# Patient Record
Sex: MALE | Race: BLACK OR AFRICAN AMERICAN | Employment: OTHER | URBAN - METROPOLITAN AREA
[De-identification: names, ages, dates, MRNs, and addresses within clinical notes are randomized per-mention and may not be internally consistent; named-entity substitution may affect disease eponyms.]

---

## 2022-12-29 ENCOUNTER — HOSPITAL ENCOUNTER (EMERGENCY)
Age: 52
Discharge: HOME | End: 2022-12-29
Attending: EMERGENCY MEDICINE
Payer: COMMERCIAL

## 2022-12-29 ENCOUNTER — APPOINTMENT (OUTPATIENT)
Dept: GENERAL RADIOLOGY | Age: 52
End: 2022-12-29
Payer: COMMERCIAL

## 2022-12-29 VITALS
DIASTOLIC BLOOD PRESSURE: 94 MMHG | SYSTOLIC BLOOD PRESSURE: 166 MMHG | HEIGHT: 69 IN | OXYGEN SATURATION: 100 % | RESPIRATION RATE: 12 BRPM | BODY MASS INDEX: 44.27 KG/M2 | WEIGHT: 298.9 LBS | HEART RATE: 86 BPM | TEMPERATURE: 97.6 F

## 2022-12-29 DIAGNOSIS — M54.42 ACUTE BILATERAL LOW BACK PAIN WITH BILATERAL SCIATICA: Primary | ICD-10-CM

## 2022-12-29 DIAGNOSIS — M54.41 ACUTE BILATERAL LOW BACK PAIN WITH BILATERAL SCIATICA: Primary | ICD-10-CM

## 2022-12-29 PROCEDURE — 72100 X-RAY EXAM L-S SPINE 2/3 VWS: CPT

## 2022-12-29 PROCEDURE — 96372 THER/PROPH/DIAG INJ SC/IM: CPT

## 2022-12-29 PROCEDURE — 6370000000 HC RX 637 (ALT 250 FOR IP): Performed by: EMERGENCY MEDICINE

## 2022-12-29 PROCEDURE — 99284 EMERGENCY DEPT VISIT MOD MDM: CPT

## 2022-12-29 PROCEDURE — 6360000002 HC RX W HCPCS: Performed by: EMERGENCY MEDICINE

## 2022-12-29 RX ORDER — METHYLPREDNISOLONE 4 MG/1
TABLET ORAL
Qty: 1 KIT | Refills: 0 | Status: SHIPPED | OUTPATIENT
Start: 2022-12-29 | End: 2023-01-04

## 2022-12-29 RX ORDER — LISINOPRIL 20 MG/1
20 TABLET ORAL DAILY
COMMUNITY

## 2022-12-29 RX ORDER — EMPAGLIFLOZIN 10 MG/1
TABLET, FILM COATED ORAL
COMMUNITY
Start: 2022-10-14

## 2022-12-29 RX ORDER — HYDROCODONE BITARTRATE AND ACETAMINOPHEN 5; 325 MG/1; MG/1
1 TABLET ORAL ONCE
Status: COMPLETED | OUTPATIENT
Start: 2022-12-29 | End: 2022-12-29

## 2022-12-29 RX ORDER — ORPHENADRINE CITRATE 30 MG/ML
60 INJECTION INTRAMUSCULAR; INTRAVENOUS ONCE
Status: COMPLETED | OUTPATIENT
Start: 2022-12-29 | End: 2022-12-29

## 2022-12-29 RX ORDER — ATORVASTATIN CALCIUM 80 MG/1
TABLET, FILM COATED ORAL
COMMUNITY
Start: 2022-12-13

## 2022-12-29 RX ORDER — AMLODIPINE BESYLATE 5 MG/1
TABLET ORAL
COMMUNITY
Start: 2022-12-12

## 2022-12-29 RX ORDER — IBUPROFEN 800 MG/1
800 TABLET ORAL EVERY 8 HOURS PRN
Qty: 20 TABLET | Refills: 0 | Status: SHIPPED | OUTPATIENT
Start: 2022-12-29 | End: 2023-01-08

## 2022-12-29 RX ORDER — OMEPRAZOLE 40 MG/1
40 CAPSULE, DELAYED RELEASE ORAL DAILY
COMMUNITY
Start: 2022-04-21

## 2022-12-29 RX ORDER — LIDOCAINE 50 MG/G
1 PATCH TOPICAL DAILY
Qty: 10 PATCH | Refills: 0 | Status: SHIPPED | OUTPATIENT
Start: 2022-12-29 | End: 2023-01-08

## 2022-12-29 RX ORDER — PREDNISONE 20 MG/1
60 TABLET ORAL ONCE
Status: COMPLETED | OUTPATIENT
Start: 2022-12-29 | End: 2022-12-29

## 2022-12-29 RX ORDER — FERROUS SULFATE 325(65) MG
TABLET ORAL
COMMUNITY
Start: 2022-12-12

## 2022-12-29 RX ORDER — METHOCARBAMOL 750 MG/1
750 TABLET, FILM COATED ORAL 3 TIMES DAILY PRN
Qty: 20 TABLET | Refills: 0 | Status: SHIPPED | OUTPATIENT
Start: 2022-12-29 | End: 2023-01-05

## 2022-12-29 RX ADMIN — PREDNISONE 60 MG: 20 TABLET ORAL at 15:46

## 2022-12-29 RX ADMIN — ORPHENADRINE CITRATE 60 MG: 30 INJECTION INTRAMUSCULAR; INTRAVENOUS at 15:46

## 2022-12-29 RX ADMIN — HYDROCODONE BITARTRATE AND ACETAMINOPHEN 1 TABLET: 5; 325 TABLET ORAL at 15:46

## 2022-12-29 ASSESSMENT — PAIN DESCRIPTION - DESCRIPTORS
DESCRIPTORS: ACHING
DESCRIPTORS: ACHING

## 2022-12-29 ASSESSMENT — PAIN SCALES - GENERAL
PAINLEVEL_OUTOF10: 7
PAINLEVEL_OUTOF10: 8

## 2022-12-29 ASSESSMENT — PAIN DESCRIPTION - PAIN TYPE
TYPE: ACUTE PAIN;CHRONIC PAIN
TYPE: ACUTE PAIN

## 2022-12-29 ASSESSMENT — PAIN DESCRIPTION - LOCATION
LOCATION: BACK
LOCATION: BACK;NECK

## 2022-12-29 ASSESSMENT — PAIN - FUNCTIONAL ASSESSMENT
PAIN_FUNCTIONAL_ASSESSMENT: 0-10
PAIN_FUNCTIONAL_ASSESSMENT: 0-10

## 2022-12-29 ASSESSMENT — PAIN DESCRIPTION - FREQUENCY
FREQUENCY: CONTINUOUS
FREQUENCY: CONTINUOUS

## 2022-12-29 ASSESSMENT — PAIN DESCRIPTION - ORIENTATION: ORIENTATION: LOWER

## 2022-12-29 NOTE — ED NOTES
Patient given prescription, discharge instructions verbal and written, patient verbalized understanding. Alert/oriented X4, Clear speech.   Patient exhibits no distress, ambulates with steady gait per self leaving unit, no further request.      Bianca Brooks RN  12/29/22 7389

## 2022-12-29 NOTE — ED PROVIDER NOTES
[facility] EMERGENCY DEPT VISIT      Patient Identification  Maira Stallworth is a 50 y.o. male. Chief Complaint   Back Pain (lower)      History of Present Illness:    History was obtained from patient. This is a  50 y.o. male who presents ambulatory  to the ED with complaints of low back pain for the last 2 days. Patient states that occasionally radiates down into both of his thighs. It is worse with movement. He denies abdominal pain. No dysuria, frequency, urgency, hematuria, incontinence or retention. No fever. No definite injury. He took 4 Advil. No known injury but does do some heavy lifting. Past Medical History:   Diagnosis Date    Diabetes mellitus ([address]     Hyperlipidemia     Hypertension        History reviewed. No pertinent surgical history. No current facility-administered medications for this encounter.     Current Outpatient Medications:     omeprazole (PRILOSEC) 40 MG delayed release capsule, Take 40 mg by mouth daily, Disp: , Rfl:     methocarbamol (ROBAXIN-750) 750 MG tablet, Take 1 tablet by mouth 3 times daily as needed (muscle spasms), Disp: 20 tablet, Rfl: 0    ibuprofen (IBU) 800 MG tablet, Take 1 tablet by mouth every 8 hours as needed for Pain, Disp: 20 tablet, Rfl: 0    lidocaine (LIDODERM) 5 %, Place 1 patch onto the skin daily for 10 days 12 hours on, 12 hours off., Disp: 10 patch, Rfl: 0    methylPREDNISolone (MEDROL, MARYCRUZ,) 4 MG tablet, Take by mouth., Disp: 1 kit, Rfl: 0    amLODIPine (NORVASC) 5 MG tablet, TAKE 1 TABLET BY MOUTH DAILY, Disp: , Rfl:     atorvastatin (LIPITOR) 80 MG tablet, TAKE 1 TABLET BY MOUTH DAILY, Disp: , Rfl:     JARDIANCE 10 MG tablet, TAKE 1 TABLET BY MOUTH EVERY MORNING, Disp: , Rfl:     FEROSUL 325 (65 Fe) MG tablet, TAKE 1 TABLET BY MOUTH DAILY, Disp: , Rfl:     lisinopril (PRINIVIL;ZESTRIL) 20 MG tablet, Take 20 mg by mouth daily, Disp: , Rfl:     metFORMIN (GLUCOPHAGE) 500 MG tablet, Take 500 mg by mouth 2 times daily (with meals), Disp: , Rfl: No Known Allergies    Social History     Socioeconomic History    Marital status: Single     Spouse name: Not on file    Number of children: Not on file    Years of education: Not on file    Highest education level: Not on file   Occupational History    Not on file   Tobacco Use    Smoking status: Never    Smokeless tobacco: Never   Substance and Sexual Activity    Alcohol use: Yes     Comment: social    Drug use: Never    Sexual activity: Not on file   Other Topics Concern    Not on file   Social History Narrative    Not on file     Social Determinants of Health     Financial Resource Strain: Not on file   Food Insecurity: Not on file   Transportation Needs: Not on file   Physical Activity: Not on file   Stress: Not on file   Social Connections: Not on file   Intimate Partner Violence: Not on file   Housing Stability: Not on file       Nursing Notes Reviewed      ROS:  General: no fever  ENT: no sinus congestion, no sore throat  RESP: no cough, no shortness of breath  CARDIAC: no chest pain  GI: no abdominal pain, no vomiting, no diarrhea  : no dysuria, no hematuria, no urgency, no frequency, no retention, no incontinence, no flank pain  Musculoskeletal: no arthralgia, + myalgia, + back pain,  no joint swelling  NEURO: no headache, no numbness, no weakness, no dizziness  DERM: no rash, no erythema, no ecchymosis, no wounds        PHYSICAL EXAM:  GENERAL APPEARANCE: Cale Grant is in no acute respiratory distress. Awake and alert. VITAL SIGNS:   ED Triage Vitals [12/29/22 1320]   Enc Vitals Group      BP (!) 166/94      Heart Rate 86      Resp 12      Temp 97.6 °F (36.4 °C)      Temp Source Oral      SpO2 100 %      Weight 298 lb 14.4 oz (135.6 kg)      Height 5' 9\" (1.753 m)      Head Circumference       Peak Flow       Pain Score       Pain Loc       Pain Edu? Excl. in [address]? HEAD: Normocephalic, atraumatic. EYES:  Extraocular muscles are intact. Conjunctivas are pink. Negative scleral icterus. ENT:  Mucous membranes are moist.  Pharynx without erythema or exudates. NECK: Nontender and supple. CHEST: Clear to auscultation bilaterally. No rales, rhonchi, or wheezing. HEART:  Regular rate and rhythm. No murmurs. Strong and equal pulses in the upper and lower extremities. ABDOMEN: Soft,  nondistended, positive bowel sounds. abdomen is nontender. No guarding. No flank tenderness  MUSCULOSKELETAL:  Active range of motion of the upper and lower extremities. No edema. Bilateral lower back tenderness and midline lumbar spine tenderness. No thoracic spine tenderness  NEUROLOGICAL: Awake, alert and oriented x 3. Power intact in the  lower extremities including hip flexion, knee flexion and extension, foot dorsiflexion and plantarflexion. Sensation intact  DERMATOLOGIC: No petechiae, rashes, or ecchymoses. ED COURSE AND MEDICAL DECISION MAKING:      Radiology:  All plain films have been evaluated by myself. They may have been overread by radiologist as noted in chart. Other radiologic studies (i.e. CT, MRI, ultrasounds, etc ) have been interpreted by radiologist.     XR LUMBAR SPINE (2-3 VIEWS)   Final Result      No acute fracture seen. Labs:  No results found for this visit on 12/29/22. Treatment in the department:  Patient received the following while in the ED:  Medications   orphenadrine (NORFLEX) injection 60 mg (60 mg IntraMUSCular Given 12/29/22 1546)   HYDROcodone-acetaminophen (NORCO) 5-325 MG per tablet 1 tablet (1 tablet Oral Given 12/29/22 1546)   predniSONE (DELTASONE) tablet 60 mg (60 mg Oral Given 12/29/22 1546)       Repeat evaluation  shows patient to be feeling better. Medical decision making and differential diagnosis:  Patient with atraumatic low back pain with radicular symptoms to both legs but no symptoms of cauda equina. No prior back imaging. No red flags for epidural abscess. Lumbar spine plain xrays unremarkable. Neuro intact.    I estimate there is LOW risk for ABDOMINAL AORTIC ANEURYSM, DISSECTION, CAUDA EQUINA SYNDROME, EPIDURAL MASS LESION, SPINAL STENOSIS, OR HERNIATED DISK CAUSING SEVERE STENOSIS, KIDNEY STONE, PYELONEPHRITIS, VERTEBRAL FRACTURE, DISCITIS, OSTEOMYELITIS, thus I consider the discharge disposition reasonable. Mary Glover and I have discussed the diagnosis and risks, and we agree with discharging home to follow-up with their primary doctor. We also discussed returning to the Emergency Department immediately if new or worsening symptoms occur. Clinical Impression:  1. Acute bilateral low back pain with bilateral sciatica        Dispo:  Patient will be discharged  at this time. Patient was informed of this decision and agrees with plan. I have discussed lab and xray findings with patient and they understand. Questions were answered to the best of my ability. Followup:  [place]  [id number] [id number]  [address]    Schedule an appointment as soon as possible for a visit       Discharge vitals:  Blood pressure (!) 166/94, pulse 86, temperature 97.6 °F (36.4 °C), temperature source Oral, resp. rate 12, height 5' 9\" (1.753 m), weight 298 lb 14.4 oz (135.6 kg), SpO2 100 %. Prescriptions given:   Discharge Medication List as of 12/29/2022  5:20 PM        START taking these medications    Details   methocarbamol (ROBAXIN-750) 750 MG tablet Take 1 tablet by mouth 3 times daily as needed (muscle spasms), Disp-20 tablet, R-0Normal      ibuprofen (IBU) 800 MG tablet Take 1 tablet by mouth every 8 hours as needed for Pain, Disp-20 tablet, R-0Normal      lidocaine (LIDODERM) 5 % Place 1 patch onto the skin daily for 10 days 12 hours on, 12 hours off., Disp-10 patch, R-0Normal      methylPREDNISolone (MEDROL, MARYCRUZ,) 4 MG tablet Take by mouth., Disp-1 kit, R-0Normal               This chart was created using dragon voice recognition software.         Elisa Hennessy MD  12/30/22 0156

## 2022-12-29 NOTE — DISCHARGE INSTRUCTIONS
Moist heat to your back. Gentle range of motion exercises. Return for fever, increased back pain, numbness or weakness of the legs, difficulty urinating or other worsening symptoms. No heavy lifting.

## 2023-01-05 ENCOUNTER — HOSPITAL ENCOUNTER (OUTPATIENT)
Age: 53
Setting detail: OBSERVATION
Discharge: HOME | End: 2023-01-07
Attending: EMERGENCY MEDICINE | Admitting: INTERNAL MEDICINE
Payer: COMMERCIAL

## 2023-01-05 ENCOUNTER — APPOINTMENT (OUTPATIENT)
Dept: CT IMAGING | Age: 53
End: 2023-01-05
Payer: COMMERCIAL

## 2023-01-05 DIAGNOSIS — I10 ESSENTIAL HYPERTENSION: ICD-10-CM

## 2023-01-05 DIAGNOSIS — R07.2 PRECORDIAL PAIN: ICD-10-CM

## 2023-01-05 DIAGNOSIS — G45.9 TIA (TRANSIENT ISCHEMIC ATTACK): Primary | ICD-10-CM

## 2023-01-05 LAB
A/G RATIO: 1.8 (ref 1.1–2.2)
ALBUMIN SERPL-MCNC: 4.6 G/DL (ref 3.4–5)
ALP BLD-CCNC: 56 U/L (ref 40–129)
ALT SERPL-CCNC: 18 U/L (ref 10–40)
ANION GAP SERPL CALCULATED.3IONS-SCNC: 13 MMOL/L (ref 3–16)
AST SERPL-CCNC: 17 U/L (ref 15–37)
BASOPHILS ABSOLUTE: 0.1 K/UL (ref 0–0.2)
BASOPHILS RELATIVE PERCENT: 2 %
BILIRUB SERPL-MCNC: 0.3 MG/DL (ref 0–1)
BUN BLDV-MCNC: 18 MG/DL (ref 7–20)
CALCIUM SERPL-MCNC: 9.6 MG/DL (ref 8.3–10.6)
CHLORIDE BLD-SCNC: 100 MMOL/L (ref 99–110)
CO2: 24 MMOL/L (ref 21–32)
CREAT SERPL-MCNC: 0.8 MG/DL (ref 0.9–1.3)
D DIMER: <0.27 UG/ML FEU (ref 0–0.6)
EOSINOPHILS ABSOLUTE: 0.2 K/UL (ref 0–0.6)
EOSINOPHILS RELATIVE PERCENT: 3.7 %
GFR SERPL CREATININE-BSD FRML MDRD: >60 ML/MIN/{1.73_M2}
GLUCOSE BLD-MCNC: 123 MG/DL (ref 70–99)
GLUCOSE BLD-MCNC: 131 MG/DL (ref 70–99)
HCT VFR BLD CALC: 42 % (ref 40.5–52.5)
HEMOGLOBIN: 13.1 G/DL (ref 13.5–17.5)
LYMPHOCYTES ABSOLUTE: 2.3 K/UL (ref 1–5.1)
LYMPHOCYTES RELATIVE PERCENT: 36 %
MCH RBC QN AUTO: 23.2 PG (ref 26–34)
MCHC RBC AUTO-ENTMCNC: 31.3 G/DL (ref 31–36)
MCV RBC AUTO: 74.2 FL (ref 80–100)
MONOCYTES ABSOLUTE: 0.5 K/UL (ref 0–1.3)
MONOCYTES RELATIVE PERCENT: 8.5 %
NEUTROPHILS ABSOLUTE: 3.1 K/UL (ref 1.7–7.7)
NEUTROPHILS RELATIVE PERCENT: 49.8 %
PDW BLD-RTO: 15.8 % (ref 12.4–15.4)
PERFORMED ON: ABNORMAL
PLATELET # BLD: 244 K/UL (ref 135–450)
PMV BLD AUTO: 7.3 FL (ref 5–10.5)
POTASSIUM REFLEX MAGNESIUM: 4.3 MMOL/L (ref 3.5–5.1)
RBC # BLD: 5.66 M/UL (ref 4.2–5.9)
SODIUM BLD-SCNC: 137 MMOL/L (ref 136–145)
TOTAL PROTEIN: 7.1 G/DL (ref 6.4–8.2)
TROPONIN: <0.01 NG/ML
WBC # BLD: 6.3 K/UL (ref 4–11)

## 2023-01-05 PROCEDURE — 6370000000 HC RX 637 (ALT 250 FOR IP): Performed by: EMERGENCY MEDICINE

## 2023-01-05 PROCEDURE — 2580000003 HC RX 258: Performed by: STUDENT IN AN ORGANIZED HEALTH CARE EDUCATION/TRAINING PROGRAM

## 2023-01-05 PROCEDURE — 1200000000 HC SEMI PRIVATE

## 2023-01-05 PROCEDURE — 85379 FIBRIN DEGRADATION QUANT: CPT

## 2023-01-05 PROCEDURE — 84484 ASSAY OF TROPONIN QUANT: CPT

## 2023-01-05 PROCEDURE — 2500000003 HC RX 250 WO HCPCS: Performed by: EMERGENCY MEDICINE

## 2023-01-05 PROCEDURE — 70498 CT ANGIOGRAPHY NECK: CPT

## 2023-01-05 PROCEDURE — 96374 THER/PROPH/DIAG INJ IV PUSH: CPT

## 2023-01-05 PROCEDURE — 85025 COMPLETE CBC W/AUTO DIFF WBC: CPT

## 2023-01-05 PROCEDURE — 93005 ELECTROCARDIOGRAM TRACING: CPT | Performed by: EMERGENCY MEDICINE

## 2023-01-05 PROCEDURE — 6360000004 HC RX CONTRAST MEDICATION: Performed by: EMERGENCY MEDICINE

## 2023-01-05 PROCEDURE — 80053 COMPREHEN METABOLIC PANEL: CPT

## 2023-01-05 PROCEDURE — 6370000000 HC RX 637 (ALT 250 FOR IP): Performed by: STUDENT IN AN ORGANIZED HEALTH CARE EDUCATION/TRAINING PROGRAM

## 2023-01-05 PROCEDURE — G0378 HOSPITAL OBSERVATION PER HR: HCPCS

## 2023-01-05 PROCEDURE — 70450 CT HEAD/BRAIN W/O DYE: CPT

## 2023-01-05 PROCEDURE — 99285 EMERGENCY DEPT VISIT HI MDM: CPT

## 2023-01-05 RX ORDER — ACETAMINOPHEN 650 MG/1
650 SUPPOSITORY RECTAL EVERY 6 HOURS PRN
Status: DISCONTINUED | OUTPATIENT
Start: 2023-01-05 | End: 2023-01-07 | Stop reason: HOSPADM

## 2023-01-05 RX ORDER — ASPIRIN 81 MG/1
324 TABLET, CHEWABLE ORAL ONCE
Status: DISCONTINUED | OUTPATIENT
Start: 2023-01-05 | End: 2023-01-05 | Stop reason: SDUPTHER

## 2023-01-05 RX ORDER — ONDANSETRON 2 MG/ML
4 INJECTION INTRAMUSCULAR; INTRAVENOUS EVERY 6 HOURS PRN
Status: DISCONTINUED | OUTPATIENT
Start: 2023-01-05 | End: 2023-01-05

## 2023-01-05 RX ORDER — LABETALOL HYDROCHLORIDE 5 MG/ML
5 INJECTION, SOLUTION INTRAVENOUS ONCE
Status: COMPLETED | OUTPATIENT
Start: 2023-01-05 | End: 2023-01-05

## 2023-01-05 RX ORDER — POLYETHYLENE GLYCOL 3350 17 G/17G
17 POWDER, FOR SOLUTION ORAL DAILY PRN
Status: DISCONTINUED | OUTPATIENT
Start: 2023-01-05 | End: 2023-01-05

## 2023-01-05 RX ORDER — SODIUM CHLORIDE 0.9 % (FLUSH) 0.9 %
5-40 SYRINGE (ML) INJECTION EVERY 12 HOURS SCHEDULED
Status: DISCONTINUED | OUTPATIENT
Start: 2023-01-05 | End: 2023-01-07 | Stop reason: HOSPADM

## 2023-01-05 RX ORDER — POLYETHYLENE GLYCOL 3350 17 G/17G
17 POWDER, FOR SOLUTION ORAL DAILY PRN
Status: DISCONTINUED | OUTPATIENT
Start: 2023-01-05 | End: 2023-01-07 | Stop reason: HOSPADM

## 2023-01-05 RX ORDER — SODIUM CHLORIDE 9 MG/ML
INJECTION, SOLUTION INTRAVENOUS PRN
Status: DISCONTINUED | OUTPATIENT
Start: 2023-01-05 | End: 2023-01-07 | Stop reason: HOSPADM

## 2023-01-05 RX ORDER — ONDANSETRON 2 MG/ML
4 INJECTION INTRAMUSCULAR; INTRAVENOUS EVERY 6 HOURS PRN
Status: DISCONTINUED | OUTPATIENT
Start: 2023-01-05 | End: 2023-01-07 | Stop reason: HOSPADM

## 2023-01-05 RX ORDER — PANTOPRAZOLE SODIUM 40 MG/10ML
40 INJECTION, POWDER, LYOPHILIZED, FOR SOLUTION INTRAVENOUS DAILY
Status: DISCONTINUED | OUTPATIENT
Start: 2023-01-06 | End: 2023-01-07 | Stop reason: HOSPADM

## 2023-01-05 RX ORDER — ACETAMINOPHEN 325 MG/1
650 TABLET ORAL EVERY 6 HOURS PRN
Status: DISCONTINUED | OUTPATIENT
Start: 2023-01-05 | End: 2023-01-07 | Stop reason: HOSPADM

## 2023-01-05 RX ORDER — SODIUM CHLORIDE 0.9 % (FLUSH) 0.9 %
5-40 SYRINGE (ML) INJECTION PRN
Status: DISCONTINUED | OUTPATIENT
Start: 2023-01-05 | End: 2023-01-07 | Stop reason: HOSPADM

## 2023-01-05 RX ORDER — ASPIRIN 325 MG
325 TABLET ORAL ONCE
Status: COMPLETED | OUTPATIENT
Start: 2023-01-05 | End: 2023-01-05

## 2023-01-05 RX ADMIN — LABETALOL HYDROCHLORIDE 5 MG: 5 INJECTION, SOLUTION INTRAVENOUS at 16:11

## 2023-01-05 RX ADMIN — NITROGLYCERIN 1 INCH: 20 OINTMENT TOPICAL at 18:00

## 2023-01-05 RX ADMIN — NITROGLYCERIN 1 INCH: 20 OINTMENT TOPICAL at 23:14

## 2023-01-05 RX ADMIN — SODIUM CHLORIDE, PRESERVATIVE FREE 10 ML: 5 INJECTION INTRAVENOUS at 23:15

## 2023-01-05 RX ADMIN — IOPAMIDOL 75 ML: 755 INJECTION, SOLUTION INTRAVENOUS at 15:01

## 2023-01-05 RX ADMIN — NITROGLYCERIN 1 INCH: 20 OINTMENT TOPICAL at 14:53

## 2023-01-05 RX ADMIN — ACETAMINOPHEN 650 MG: 325 TABLET ORAL at 23:14

## 2023-01-05 RX ADMIN — ASPIRIN 325 MG ORAL TABLET 325 MG: 325 PILL ORAL at 14:54

## 2023-01-05 ASSESSMENT — ENCOUNTER SYMPTOMS
ABDOMINAL PAIN: 0
SORE THROAT: 0
CHEST TIGHTNESS: 0
RHINORRHEA: 0
VOICE CHANGE: 0
COUGH: 0
EYE REDNESS: 0
TROUBLE SWALLOWING: 0
ANAL BLEEDING: 0
STRIDOR: 0
ABDOMINAL DISTENTION: 0
DIARRHEA: 0
EYE PAIN: 0
BACK PAIN: 0
WHEEZING: 0
BLOOD IN STOOL: 0
EYE ITCHING: 0
CONSTIPATION: 0
SHORTNESS OF BREATH: 0
NAUSEA: 0
EYE DISCHARGE: 0
PHOTOPHOBIA: 0
FACIAL SWELLING: 0
SINUS PRESSURE: 0
VOMITING: 0

## 2023-01-05 ASSESSMENT — PAIN DESCRIPTION - LOCATION: LOCATION: CHEST

## 2023-01-05 ASSESSMENT — PAIN SCALES - GENERAL
PAINLEVEL_OUTOF10: 4
PAINLEVEL_OUTOF10: 0

## 2023-01-05 ASSESSMENT — PAIN DESCRIPTION - ORIENTATION: ORIENTATION: LEFT

## 2023-01-05 ASSESSMENT — PAIN DESCRIPTION - PAIN TYPE: TYPE: ACUTE PAIN

## 2023-01-05 NOTE — ED PROVIDER NOTES
Yoko Palencia is a 50year old male with a history of type 2 diabetes and HTN who experienced abrupt onset of light headedness/dizziness, left facial numbness and numbness/weakness of the LUE one hour prior to arrival. He also experienced chest pain under the left breast. NIH stroke score is 0 at the time of my evaluation at 2:10 pm. No history of CAD or stroke in the past.      BP (!) 168/88   Pulse 93   Temp 98 °F (36.7 °C) (Oral)   Resp 16   Ht 5' 9\" (1.753 m)   Wt (!) 300 lb 7 oz (136.3 kg)   SpO2 99%   BMI 44.37 kg/m²     I have reviewed the following from the nursing documentation:      Prior to Admission medications    Medication Sig Start Date End Date Taking?  Authorizing Provider   amLODIPine (NORVASC) 5 MG tablet TAKE 1 TABLET BY MOUTH DAILY 12/12/22   Historical Provider, MD   atorvastatin (LIPITOR) 80 MG tablet TAKE 1 TABLET BY MOUTH DAILY 12/13/22   Historical Provider, MD   JARDIANCE 10 MG tablet TAKE 1 TABLET BY MOUTH EVERY MORNING 10/14/22   Historical Provider, MD   FEROSESVIN 325 (65 Fe) MG tablet TAKE 1 TABLET BY MOUTH DAILY 12/12/22   Historical Provider, MD   lisinopril (PRINIVIL;ZESTRIL) 20 MG tablet Take 20 mg by mouth daily    Historical Provider, MD   metFORMIN (GLUCOPHAGE) 500 MG tablet Take 500 mg by mouth 2 times daily (with meals)    Historical Provider, MD   omeprazole (PRILOSEC) 40 MG delayed release capsule Take 40 mg by mouth daily 4/21/22   Historical Provider, MD   methocarbamol (ROBAXIN-750) 750 MG tablet Take 1 tablet by mouth 3 times daily as needed (muscle spasms) 12/29/22 1/5/23  Juan Field MD   ibuprofen (IBU) 800 MG tablet Take 1 tablet by mouth every 8 hours as needed for Pain 12/29/22 1/8/23  Juan Field MD   lidocaine (LIDODERM) 5 % Place 1 patch onto the skin daily for 10 days 12 hours on, 12 hours off. 12/29/22 1/8/23  Juan Field MD       Allergies as of 01/05/2023    (No Known Allergies)       Past Medical History:   Diagnosis Date Diabetes mellitus ([facility] [address]     Hyperlipidemia     Hypertension         Surgical History: History reviewed. No pertinent surgical history. Family History:  History reviewed. No pertinent family history. Social History     Socioeconomic History    Marital status: Single     Spouse name: Not on file    Number of children: Not on file    Years of education: Not on file    Highest education level: Not on file   Occupational History    Not on file   Tobacco Use    Smoking status: Never    Smokeless tobacco: Never   Substance and Sexual Activity    Alcohol use: Yes     Comment: social    Drug use: Never    Sexual activity: Not on file   Other Topics Concern    Not on file   Social History Narrative    Not on file     Social Determinants of Health     Financial Resource Strain: Not on file   Food Insecurity: Not on file   Transportation Needs: Not on file   Physical Activity: Not on file   Stress: Not on file   Social Connections: Not on file   Intimate Partner Violence: Not on file   Housing Stability: Not on file       Review of Systems   Constitutional:  Negative for activity change, appetite change, chills, diaphoresis, fatigue and fever. HENT: Negative. Negative for congestion, dental problem, ear pain, facial swelling, rhinorrhea, sinus pressure, sneezing, sore throat, tinnitus, trouble swallowing and voice change. Eyes:  Negative for photophobia, pain, discharge, redness, itching and visual disturbance. Respiratory:  Negative for cough, chest tightness, shortness of breath, wheezing and stridor. Cardiovascular:  Positive for chest pain. Negative for palpitations and leg swelling. Gastrointestinal:  Negative for abdominal distention, abdominal pain, anal bleeding, blood in stool, constipation, diarrhea, nausea and vomiting. Genitourinary:  Negative for difficulty urinating, dysuria, frequency, hematuria, penile discharge, testicular pain and urgency.    Musculoskeletal:  Negative for back pain, joint swelling, neck pain and neck stiffness. Skin:  Negative for rash and wound. Neurological:  Positive for numbness (left face and LUE). Negative for dizziness, syncope, facial asymmetry, speech difficulty, weakness and headaches. Hematological:  Does not bruise/bleed easily. Psychiatric/Behavioral:  Negative for agitation, confusion, hallucinations, self-injury, sleep disturbance and suicidal ideas. The patient is not nervous/anxious. All other systems reviewed and are negative. Physical Exam  Vitals and nursing note reviewed. Constitutional:       General: He is not in acute distress. Appearance: He is well-developed. HENT:      Head: Normocephalic and atraumatic. Right Ear: External ear normal.      Left Ear: External ear normal.      Nose: Nose normal.      Mouth/Throat:      Pharynx: No oropharyngeal exudate. Eyes:      General: No visual field deficit or scleral icterus. Right eye: No discharge. Left eye: No discharge. Conjunctiva/sclera: Conjunctivae normal.      Pupils: Pupils are equal, round, and reactive to light. Neck:      Vascular: No JVD. Trachea: No tracheal deviation. Cardiovascular:      Rate and Rhythm: Normal rate and regular rhythm. Heart sounds: Normal heart sounds. No murmur heard. No friction rub. No gallop. Pulmonary:      Effort: Pulmonary effort is normal. No respiratory distress. Breath sounds: Normal breath sounds. No wheezing or rales. Abdominal:      General: Bowel sounds are normal. There is no distension. Palpations: Abdomen is soft. There is no mass. Tenderness: There is no abdominal tenderness. There is no guarding or rebound. Musculoskeletal:         General: No tenderness. Normal range of motion. Cervical back: Normal range of motion and neck supple. Lymphadenopathy:      Cervical: No cervical adenopathy. Skin:     General: Skin is warm and dry. Coloration: Skin is not pale. Findings: No erythema or rash. Neurological:      Mental Status: He is alert and oriented to person, place, and time. GCS: GCS eye subscore is 4. GCS verbal subscore is 5. GCS motor subscore is 6. Cranial Nerves: No cranial nerve deficit, dysarthria or facial asymmetry. Sensory: Sensation is intact. Motor: Motor function is intact. No abnormal muscle tone. Coordination: Coordination is intact. Coordination normal.      Deep Tendon Reflexes: Reflexes are normal and symmetric. Reflexes normal. Babinski sign absent on the right side. Babinski sign absent on the left side. Reflex Scores:       Bicep reflexes are 2+ on the right side and 2+ on the left side. Patellar reflexes are 2+ on the right side and 2+ on the left side. Comments: Coordination, gait, speech, balance and cognition are intact. There is no nuchal rigidity or evidence of meningismus. Negative Kernig's and Brudzinski's signs. All sensory and motor components of the brachial/lumbosacral plexus tested are symmetric and intact. No focal deficits appreciated. NIH stroke score 0. Psychiatric:         Behavior: Behavior normal.         Thought Content:  Thought content normal.         Judgment: Judgment normal.        Procedures     MDM    CBC with Auto Differential   Result Value Ref Range    WBC 6.3 4.0 - 11.0 K/uL    RBC 5.66 4.20 - 5.90 M/uL    Hemoglobin 13.1 (L) 13.5 - 17.5 g/dL    Hematocrit 42.0 40.5 - 52.5 %    MCV 74.2 (L) 80.0 - 100.0 fL    MCH 23.2 (L) 26.0 - 34.0 pg    MCHC 31.3 31.0 - 36.0 g/dL    RDW 15.8 (H) 12.4 - 15.4 %    Platelets 388 800 - 545 K/uL    MPV 7.3 5.0 - 10.5 fL    Neutrophils % 49.8 %    Lymphocytes % 36.0 %    Monocytes % 8.5 %    Eosinophils % 3.7 %    Basophils % 2.0 %    Neutrophils Absolute 3.1 1.7 - 7.7 K/uL    Lymphocytes Absolute 2.3 1.0 - 5.1 K/uL    Monocytes Absolute 0.5 0.0 - 1.3 K/uL    Eosinophils Absolute 0.2 0.0 - 0.6 K/uL    Basophils Absolute 0.1 0.0 - 0.2 K/uL   Comprehensive Metabolic Panel w/ Reflex to MG   Result Value Ref Range    Sodium 137 136 - 145 mmol/L    Potassium reflex Magnesium 4.3 3.5 - 5.1 mmol/L    Chloride 100 99 - 110 mmol/L    CO2 24 21 - 32 mmol/L    Anion Gap 13 3 - 16    Glucose 123 (H) 70 - 99 mg/dL    BUN 18 7 - 20 mg/dL    Creatinine 0.8 (L) 0.9 - 1.3 mg/dL    Est, Glom Filt Rate >60 >60    Calcium 9.6 8.3 - 10.6 mg/dL    Total Protein 7.1 6.4 - 8.2 g/dL    Albumin 4.6 3.4 - 5.0 g/dL    Albumin/Globulin Ratio 1.8 1.1 - 2.2    Total Bilirubin 0.3 0.0 - 1.0 mg/dL    Alkaline Phosphatase 56 40 - 129 U/L    ALT 18 10 - 40 U/L    AST 17 15 - 37 U/L   Troponin   Result Value Ref Range    Troponin <0.01 <0.01 ng/mL   D-Dimer, Quantitative   Result Value Ref Range    D-Dimer, Quant <0.27 0.00 - 0.60 ug/mL FEU   POCT Glucose   Result Value Ref Range    POC Glucose 131 (H) 70 - 99 mg/dl    Performed on ACCU-CHEK    EKG 12 Lead   Result Value Ref Range    Ventricular Rate 92 BPM    Atrial Rate 92 BPM    P-R Interval 132 ms    QRS Duration 70 ms    Q-T Interval 362 ms    QTc Calculation (Bazett) 447 ms    P Axis 45 degrees    R Axis 12 degrees    T Axis 63 degrees    Diagnosis       Normal sinus rhythmNonspecific T wave abnormalityAbnormal ECG       I spoke with Dr. Nicole, hospitalist at [facility]. We thoroughly discussed the history, physical exam, laboratory and imaging studies, as well as, emergency department course. Based upon that discussion, we've decided to admit Ross Downey for further observation and evaluation of Ross Downey's CVA-like symptoms.  As I have deemed necessary from their history, physical and studies, I have considered and evaluated Ross Downey for the following diagnoses:  DIABETES, INTRACRANIAL HEMORRHAGE, MENINGITIS, SUBARACHNOID HEMORRHAGE, SUBDURAL HEMATOMA, & STROKE.    FINAL IMPRESSION  1. TIA (transient ischemic attack)    2. Precordial pain    3. Essential hypertension        Vitals:  Blood pressure (!) 160/85,  pulse 95, temperature 98 °F (36.7 °C), temperature source Oral, resp. rate 20, height 5' 9\" (1.753 m), weight (!) 300 lb 7 oz (136.3 kg), SpO2 96 %. Radiology    CT HEAD WO CONTRAST    Result Date: 1/5/2023  1. Normal brain 2. Left maxillary sinus retention cyst    CTA HEAD NECK W CONTRAST    Result Date: 1/5/2023  1. Normal right internal carotid artery. . 2. Normal left internal carotid artery 3. Normal vertebral arteries. 4. Normal intracranial circulation. EKG Interpretation. The Ekg interpreted by me in the absence of a cardiologist shows. normal sinus rhythm with a rate of 92  Axis is   Normal  QTc is  within an acceptable range  Intervals and Durations are unremarkable. No specific ST-T wave changes appreciated. No evidence of acute ischemia. No old EKGs available for comparison.            Jessica Richardson MD  01/05/23 1800

## 2023-01-06 PROBLEM — R07.89 LEFT CHEST PRESSURE: Status: ACTIVE | Noted: 2023-01-05

## 2023-01-06 PROBLEM — G45.9 TIA (TRANSIENT ISCHEMIC ATTACK): Status: ACTIVE | Noted: 2023-01-06

## 2023-01-06 LAB
ANION GAP SERPL CALCULATED.3IONS-SCNC: 14 MMOL/L (ref 3–16)
BUN BLDV-MCNC: 15 MG/DL (ref 7–20)
CALCIUM SERPL-MCNC: 9.6 MG/DL (ref 8.3–10.6)
CHLORIDE BLD-SCNC: 101 MMOL/L (ref 99–110)
CHOLESTEROL, TOTAL: 149 MG/DL (ref 0–199)
CO2: 23 MMOL/L (ref 21–32)
CREAT SERPL-MCNC: 0.9 MG/DL (ref 0.9–1.3)
ESTIMATED AVERAGE GLUCOSE: 151.3 MG/DL
GFR SERPL CREATININE-BSD FRML MDRD: >60 ML/MIN/{1.73_M2}
GLUCOSE BLD-MCNC: 106 MG/DL (ref 70–99)
GLUCOSE BLD-MCNC: 108 MG/DL (ref 70–99)
GLUCOSE BLD-MCNC: 112 MG/DL (ref 70–99)
GLUCOSE BLD-MCNC: 115 MG/DL (ref 70–99)
GLUCOSE BLD-MCNC: 157 MG/DL (ref 70–99)
GLUCOSE BLD-MCNC: 167 MG/DL (ref 70–99)
HBA1C MFR BLD: 6.9 %
HCT VFR BLD CALC: 41.4 % (ref 40.5–52.5)
HDLC SERPL-MCNC: 65 MG/DL (ref 40–60)
HEMOGLOBIN: 13.1 G/DL (ref 13.5–17.5)
LDL CHOLESTEROL CALCULATED: 73 MG/DL
LV EF: 58 %
LV EF: 75 %
LVEF MODALITY: NORMAL
LVEF MODALITY: NORMAL
MCH RBC QN AUTO: 23.6 PG (ref 26–34)
MCHC RBC AUTO-ENTMCNC: 31.7 G/DL (ref 31–36)
MCV RBC AUTO: 74.5 FL (ref 80–100)
PDW BLD-RTO: 15.8 % (ref 12.4–15.4)
PERFORMED ON: ABNORMAL
PLATELET # BLD: 232 K/UL (ref 135–450)
PMV BLD AUTO: 6.9 FL (ref 5–10.5)
POTASSIUM REFLEX MAGNESIUM: 4.7 MMOL/L (ref 3.5–5.1)
RBC # BLD: 5.56 M/UL (ref 4.2–5.9)
SODIUM BLD-SCNC: 138 MMOL/L (ref 136–145)
TRIGL SERPL-MCNC: 56 MG/DL (ref 0–150)
VLDLC SERPL CALC-MCNC: 11 MG/DL
WBC # BLD: 6.9 K/UL (ref 4–11)

## 2023-01-06 PROCEDURE — 85027 COMPLETE CBC AUTOMATED: CPT

## 2023-01-06 PROCEDURE — 36415 COLL VENOUS BLD VENIPUNCTURE: CPT

## 2023-01-06 PROCEDURE — G0378 HOSPITAL OBSERVATION PER HR: HCPCS

## 2023-01-06 PROCEDURE — 80061 LIPID PANEL: CPT

## 2023-01-06 PROCEDURE — 6370000000 HC RX 637 (ALT 250 FOR IP)

## 2023-01-06 PROCEDURE — 83036 HEMOGLOBIN GLYCOSYLATED A1C: CPT

## 2023-01-06 PROCEDURE — 6360000002 HC RX W HCPCS: Performed by: STUDENT IN AN ORGANIZED HEALTH CARE EDUCATION/TRAINING PROGRAM

## 2023-01-06 PROCEDURE — C8929 TTE W OR WO FOL WCON,DOPPLER: HCPCS

## 2023-01-06 PROCEDURE — 6360000002 HC RX W HCPCS: Performed by: INTERNAL MEDICINE

## 2023-01-06 PROCEDURE — 96372 THER/PROPH/DIAG INJ SC/IM: CPT

## 2023-01-06 PROCEDURE — A9502 TC99M TETROFOSMIN: HCPCS | Performed by: INTERNAL MEDICINE

## 2023-01-06 PROCEDURE — 93017 CV STRESS TEST TRACING ONLY: CPT

## 2023-01-06 PROCEDURE — 97165 OT EVAL LOW COMPLEX 30 MIN: CPT

## 2023-01-06 PROCEDURE — 99233 SBSQ HOSP IP/OBS HIGH 50: CPT | Performed by: INTERNAL MEDICINE

## 2023-01-06 PROCEDURE — 80048 BASIC METABOLIC PNL TOTAL CA: CPT

## 2023-01-06 PROCEDURE — 97161 PT EVAL LOW COMPLEX 20 MIN: CPT

## 2023-01-06 PROCEDURE — 6370000000 HC RX 637 (ALT 250 FOR IP): Performed by: EMERGENCY MEDICINE

## 2023-01-06 PROCEDURE — 96375 TX/PRO/DX INJ NEW DRUG ADDON: CPT

## 2023-01-06 PROCEDURE — 97116 GAIT TRAINING THERAPY: CPT

## 2023-01-06 PROCEDURE — 92610 EVALUATE SWALLOWING FUNCTION: CPT

## 2023-01-06 PROCEDURE — C9113 INJ PANTOPRAZOLE SODIUM, VIA: HCPCS | Performed by: STUDENT IN AN ORGANIZED HEALTH CARE EDUCATION/TRAINING PROGRAM

## 2023-01-06 PROCEDURE — 99222 1ST HOSP IP/OBS MODERATE 55: CPT | Performed by: NURSE PRACTITIONER

## 2023-01-06 PROCEDURE — 2580000003 HC RX 258: Performed by: STUDENT IN AN ORGANIZED HEALTH CARE EDUCATION/TRAINING PROGRAM

## 2023-01-06 PROCEDURE — 78452 HT MUSCLE IMAGE SPECT MULT: CPT

## 2023-01-06 PROCEDURE — 3430000000 HC RX DIAGNOSTIC RADIOPHARMACEUTICAL: Performed by: INTERNAL MEDICINE

## 2023-01-06 RX ORDER — ATORVASTATIN CALCIUM 80 MG/1
80 TABLET, FILM COATED ORAL DAILY
Status: DISCONTINUED | OUTPATIENT
Start: 2023-01-06 | End: 2023-01-07 | Stop reason: HOSPADM

## 2023-01-06 RX ORDER — AMLODIPINE BESYLATE 5 MG/1
5 TABLET ORAL DAILY
Status: DISCONTINUED | OUTPATIENT
Start: 2023-01-06 | End: 2023-01-07 | Stop reason: HOSPADM

## 2023-01-06 RX ORDER — ENOXAPARIN SODIUM 100 MG/ML
30 INJECTION SUBCUTANEOUS 2 TIMES DAILY
Status: DISCONTINUED | OUTPATIENT
Start: 2023-01-06 | End: 2023-01-07 | Stop reason: HOSPADM

## 2023-01-06 RX ORDER — SODIUM CHLORIDE 9 MG/ML
INJECTION, SOLUTION INTRAVENOUS CONTINUOUS
Status: DISCONTINUED | OUTPATIENT
Start: 2023-01-06 | End: 2023-01-06

## 2023-01-06 RX ADMIN — SODIUM CHLORIDE, PRESERVATIVE FREE 10 ML: 5 INJECTION INTRAVENOUS at 20:01

## 2023-01-06 RX ADMIN — AMLODIPINE BESYLATE 5 MG: 5 TABLET ORAL at 18:16

## 2023-01-06 RX ADMIN — PANTOPRAZOLE SODIUM 40 MG: 40 INJECTION, POWDER, LYOPHILIZED, FOR SOLUTION INTRAVENOUS at 08:34

## 2023-01-06 RX ADMIN — ENOXAPARIN SODIUM 30 MG: 100 INJECTION SUBCUTANEOUS at 20:01

## 2023-01-06 RX ADMIN — SODIUM CHLORIDE, PRESERVATIVE FREE 10 ML: 5 INJECTION INTRAVENOUS at 08:34

## 2023-01-06 RX ADMIN — TETROFOSMIN 10 MILLICURIE: 1.38 INJECTION, POWDER, LYOPHILIZED, FOR SOLUTION INTRAVENOUS at 10:57

## 2023-01-06 RX ADMIN — NITROGLYCERIN 1 INCH: 20 OINTMENT TOPICAL at 18:16

## 2023-01-06 RX ADMIN — TETROFOSMIN 30 MILLICURIE: 1.38 INJECTION, POWDER, LYOPHILIZED, FOR SOLUTION INTRAVENOUS at 12:00

## 2023-01-06 RX ADMIN — SODIUM CHLORIDE: 9 INJECTION, SOLUTION INTRAVENOUS at 01:57

## 2023-01-06 RX ADMIN — ENOXAPARIN SODIUM 30 MG: 100 INJECTION SUBCUTANEOUS at 13:08

## 2023-01-06 RX ADMIN — REGADENOSON 0.4 MG: 0.08 INJECTION, SOLUTION INTRAVENOUS at 11:26

## 2023-01-06 RX ADMIN — NITROGLYCERIN 1 INCH: 20 OINTMENT TOPICAL at 06:00

## 2023-01-06 RX ADMIN — ATORVASTATIN CALCIUM 80 MG: 80 TABLET, FILM COATED ORAL at 18:16

## 2023-01-06 ASSESSMENT — ENCOUNTER SYMPTOMS
NAUSEA: 0
CONSTIPATION: 0
COUGH: 0
DIARRHEA: 0
BACK PAIN: 0
VOMITING: 0
SHORTNESS OF BREATH: 0

## 2023-01-06 ASSESSMENT — PAIN SCALES - GENERAL: PAINLEVEL_OUTOF10: 0

## 2023-01-06 NOTE — PROGRESS NOTES
Occupational Therapy/Physical Therapy    OT/PT orders received, chart reviewed, pt is currently off floor, getting echo. Will follow up later today vs 1/7 as schedule allows.   Paty Spears OTR/ROSA [address]  Jewel Mirza, PT

## 2023-01-06 NOTE — PLAN OF CARE
Problem: Discharge Planning  Goal: Discharge to home or other facility with appropriate resources  Flowsheets (Taken 1/5/2023 4003)  Discharge to home or other facility with appropriate resources:   Identify barriers to discharge with patient and caregiver   Arrange for needed discharge resources and transportation as appropriate   Identify discharge learning needs (meds, wound care, etc)   Arrange for interpreters to assist at discharge as needed   Refer to discharge planning if patient needs post-hospital services based on physician order or complex needs related to functional status, cognitive ability or social support system

## 2023-01-06 NOTE — PROGRESS NOTES
Physical Therapy  Facility/Department: [place] [address] 5T ORTHO/NEURO  Physical Therapy Initial Assessment/Discharge from Acute PT    Name: Gemini Reyez  : 1970  MRN: [id number]  Date of Service: 2023    Discharge Recommendations:  Gemini Reyez scored a 24/24 on the AM-PAC short mobility form. At this time, no further PT is recommended upon discharge due to pt performing all functional mobility at [place] level. Pt reporting that he is functioning at his baseline and has no concerns for d/c. Bassett Anali Recommend patient returns to prior setting with prior services. PT Equipment Recommendations  Equipment Needed: No      Patient Diagnosis(es): The primary encounter diagnosis was TIA (transient ischemic attack). Diagnoses of Precordial pain and Essential hypertension were also pertinent to this visit. Past Medical History:  has a past medical history of Diabetes mellitus ([address] Hyperlipidemia, and Hypertension. Past Surgical History:  has no past surgical history on file. Assessment   Assessment: Pt is a 50 y.o. male with diagnosis of precordial pain. No acute goals identifed due to pt performing all functional mobility at [place] level. Pt reporting that he is functioning at his baseline and has no concerns for d/c. PT will sign off at this time. PT recommends initial assist PRN for safety. Treatment Diagnosis: precordial pain  Therapy Prognosis: Good  Decision Making: Low Complexity  Requires PT Follow-Up: No  Activity Tolerance  Activity Tolerance: Patient tolerated treatment well     Plan   Physcial Therapy Plan  Additional Comments: No acute goals identifed due to pt performing all functional mobility at [place] level. Pt reporting that he is functioning at his baseline and has no concerns for d/c. Safety Devices  Type of Devices:  All fall risk precautions in place, Call light within reach, Gait belt, Left in chair, Nurse notified (per RN, ok to leave pt off chair alarm) Restrictions  Position Activity Restriction  Other position/activity restrictions: up as tolerated     Subjective   General  Chart Reviewed: Yes  Patient assessed for rehabilitation services?: Yes  Additional Pertinent Hx: Pt is a 50 y.o. male admitted to [facility] from [facility] ED with complaints of sudden onset of lightheadedness/dizziness, L facial numbness, LUE numbness and weakness, and L chest pain. CT head: L maxillary sinus retention cyst.  PMH: DM, HLD, HTN. Family / Caregiver Present: Yes (SO)  Referring Practitioner: Danya Chino MD  Diagnosis: precordial pain  Follows Commands: Within Functional Limits  General Comment  Comments: Pt found seated in recliner upon PT arrival.  Pt agreeable to therapy session. Subjective  Subjective: \"I feel pretty good right now actually. \"         Social/Functional History  Social/Functional History  Lives With: Spouse  Type of Home: House  Home Layout: One level, Able to Live on Main level with bedroom/bathroom  Home Access: Stairs to enter with rails  Entrance Stairs - Number of Steps: 6-7 AARON with LHR  Bathroom Shower/Tub: Tub/Shower unit (pt typically stands)  Bathroom Toilet: Standard (sink nearby)  [employer]: Grab bars in shower  Bathroom Accessibility: Not accessible  Home Equipment: None  Has the patient had two or more falls in the past year or any fall with injury in the past year?: No  ADL Assistance: Independent  Homemaking Assistance: Independent  Ambulation Assistance: Independent (without AD)  Transfer Assistance: Independent  Active : Yes  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   Vision/Hearing  Vision  Vision: Impaired  Vision Exceptions: Wears glasses for reading  Hearing  Hearing: Within functional limits    Cognition   Orientation  Overall Orientation Status: Within Functional Limits  Cognition  Overall Cognitive Status: WFL     Objective   Heart Rate: 71  Heart Rate Source: Monitor  BP: (!) 171/102  BP Location: Right upper arm  BP Method: Automatic  Patient Position: Up in chair  MAP (Calculated): 125  Resp: 16  SpO2: 98 %  O2 Device: None (Room air)              AROM RLE (degrees)  RLE AROM: WFL  AROM LLE (degrees)  LLE AROM : WFL  Strength RLE  Strength RLE: WFL  Comment: based on functional mobility  Strength LLE  Strength LLE: WFL  Comment: based on functional mobility           Bed mobility  Bed Mobility Comments: pt found and left seated in recliner  Transfers  Sit to Stand: Independent (from recliner, toilet, and chair to no AD)  Stand to Sit: Independent  Ambulation  Surface: Level tile  Device: No Device  Assistance: Independent  Quality of Gait: normal [place]  Distance: 150' + 150' + small distance in room  Comments: Pt reporting that he is ambulating at his baseline and has no concerns for d/c. Stairs/Curb  Stairs?: Yes  Stairs  # Steps : 10  Stairs Height: 6\" (Pt ascend/descend 6, 4\" stairs and 4, 6\" stairs)  Rails: Left ascending  Assistance: Modified independent   Comment: Alternating pattern. Pt reporting that he is performing stairs at his baseline and has no concerns for d/c. Balance  Comments: Ind for standing balance at sink with no UE support for gown change and oral care, see OT note for comments. OutComes Score                                                  AM-PAC Score  AM-PAC Inpatient Mobility Raw Score : 24 (01/06/23 1547)  AM-PAC Inpatient T-Scale Score : 61.14 (01/06/23 1547)  Mobility Inpatient CMS 0-100% Score: 0 (01/06/23 1547)  Mobility Inpatient CMS G-Code Modifier : CH (01/06/23 1547)          Tinneti Score       Goals  Short Term Goals  Time Frame for Short Term Goals: No acute goals identifed due to pt performing all functional mobility at [place] level.   Pt reporting that he is functioning at his baseline and has no concerns for d/c. Patient Goals   Patient Goals : \"To get out of here and get home. \"       Education  Patient Education  Education Given To: Patient; Family  Education Provided: Role of Therapy;Plan of Care  Education Method: Verbal  Barriers to Learning: None  Education Outcome: Verbalized understanding      Therapy Time   Individual Concurrent Group Co-treatment   Time In 1508         Time Out 1532         Minutes 24             Timed Code Treatment Minutes:   9    Total Treatment Minutes:  LONDON Mcclellan   This note to serve as discharge summary if patient discharged before next session.

## 2023-01-06 NOTE — PROGRESS NOTES
Speech Language Pathology  Facility/Department: [facility] 5T ORTHO/NEURO   CLINICAL BEDSIDE SWALLOW EVALUATION/Discharge    NAME: Kelsi Villagomez  : 1970  MRN: [id number]    ADMISSION DATE: 2023  ADMITTING DIAGNOSIS: has Left chest pressure on their problem list.  ONSET DATE: 23    Recent Chest Xray/CT of Chest: none    CT Head: 23  Impression   1. Normal brain   2. Left maxillary sinus retention cyst           Date of Eval: 2023  Evaluating Therapist: ANDRZEJ Brennan    Current Diet level:  Current Diet : NPO    Primary Complaint  Patient Complaint: none    Pain:  Pain Assessment  Pain Assessment: None - Denies Pain  Pain Level: 0  Patient's Stated Pain Goal: 0 - No pain  Pain Location: Chest  Pain Orientation: Left  Pain Type: Acute pain  Response to Pain Intervention: Pain improved but above pain goal  Side Effects: No reported side effects    Reason for Referral  Kelsi Villagomez was referred for a bedside swallow evaluation to assess the efficiency of his swallow function, identify signs and symptoms of aspiration and make recommendations regarding safe dietary consistencies, effective compensatory strategies, and safe eating environment. Impression  Dysphagia Diagnosis: Swallow function appears WFL  Dysphagia Impression : Pt presents with swallow function WFL. Pt seated upright in bed and readily self fed all trials of thin liquids, puree and regular solids. Pt demonstrated timely and complete mastication with no oral residue remaining. Pt appeared to swallow all trials with no overt s/s penetration/aspiration. Clear vocal quality noted throughout. Based on assessment SLP recommend oral diet initiation of Regular Solids and Thin Liquids. No further SLP services warranted at this time. Please re-consult as needed/appropriate.   Dysphagia Outcome Severity Scale: Level 7: Normal in all situations     Treatment Plan  Requires SLP Intervention: No     D/C Recommendations: No follow up therapy recommended post discharge     Recommended Diet and Intervention  Recommended Diet: Regular Solids, Thin Liquids  Recommended Form of Meds: PO     Compensatory Swallowing Strategies  Compensatory Swallowing Strategies : Upright as possible for all oral intake    General  Chart Reviewed: Yes  Subjective  Subjective: Pt seated upright in bed agreeable to SLP evaluation at this time. Wife on speaker phone. Behavior/Cognition: Alert; Cooperative;Pleasant mood  Respiratory Status: Room air  O2 Device: None (Room air)  Communication Observation: Functional  Follows Directions: Complex  Dentition: Adequate  Patient Positioning: Upright in bed  Baseline Vocal Quality: Normal  Volitional Cough: Strong  Prior Dysphagia History: none  Consistencies Administered: Ice Chips; Thin - cup; Thin - straw;Pureed;Regular    Vision/Hearing  Vision  Vision: Within Functional Limits  Hearing  Hearing: Within functional limits    Oral Motor Deficits  Oral/Motor  Oral Hygiene: Moist;Clean    Oral Phase Dysfunction  Oral Phase  Oral Phase: WNL     Indicators of Pharyngeal Phase Dysfunction   Pharyngeal Phase   Pharyngeal Phase: Presumed WFL    Prognosis  Individuals consulted  Consulted and agree with results and recommendations: Patient;RN;Family member  Family member consulted: wife-on the phone  RN Name: Sarah Sood    Education  Patient Education: Discussed rationale for evaluation, results and recommendations. Patient Education Response: Verbalizes understanding;Demonstrated understanding  Safety Devices in place: Yes  Type of devices: All fall risk precautions in place       Pt's goal:  To go home    Plan:  Discharge from SLP services. No further intervention required at this time. Please re-consult as needed/appropriate and/or if s/s penetration/aspiration emerge. Recommended diet: Regular Solids, Thin Liquids  -meds PO  Discharge Plan:  To be determined closer to discharge  Discussed with RN: Sarah King   Needs within reach.     Therapy Time  SLP Individual Minutes  Time In: 9545  Time Out: 6167  Minutes: 9     SLP Total Treatment Time  Total Treatment Time: 9      Electronically signed by:  Wilber Elizabeth M.A., [address] [place] [address]  Speech-Language Pathologist  Pg #: [phone]

## 2023-01-06 NOTE — PROGRESS NOTES
Occupational Therapy  Facility/Department: [facility] 5T ORTHO/NEURO  Occupational Therapy Initial Assessment/tx/discharge    Name: Kilo Richardson  : 1970  MRN: [id number]  Date of Service: 2023    Discharge Recommendations:  Kilo Richardson scored a 24/24 on the AM-PAC ADL Inpatient form. At this time, no further OT is recommended upon discharge due to pt's level of indep. OT Equipment Recommendations  Equipment Needed: No       Patient Diagnosis(es): The primary encounter diagnosis was TIA (transient ischemic attack). Diagnoses of Precordial pain and Essential hypertension were also pertinent to this visit. Past Medical History:  has a past medical history of Diabetes mellitus ([facility] [address] Hyperlipidemia, and Hypertension. Past Surgical History:  has no past surgical history on file. Assessment   Assessment: Pt admitted from OSH with lightheadedness, dizziness, L facial numbness/weakness, back and chest pain, dx of concern for TIA, precordial pain. He appears to be functioning at baseline level:  no UE deficits, indep with ADLs, indep with functional transfsers. No acute OT needs, d/c OT. Recommend discharge to home.   Decision Making: Low Complexity  REQUIRES OT FOLLOW-UP: No  Activity Tolerance  Activity Tolerance: Patient Tolerated treatment well        Plan   Occupational Therapy Plan  Additional Comments: d/c OT     Restrictions  Position Activity Restriction  Other position/activity restrictions: up as tolerated    Subjective   General  Patient assessed for rehabilitation services?: Yes  Additional Pertinent Hx: PMH:  DM, hyperlipidemia, HTN  Referring Practitioner: Jorge L Reynolds MD  Diagnosis: Pt admitted from OSH with lightheadedness, dizziness, L facial numbness/weakness, back and chest pain, dx of concern for TIA, precordial pain-head CT=neg, CTA head/neck=normal, stress test=EF is 75%     Social/Functional History  Social/Functional History  Lives With: Spouse  Type of Home: House  Home Layout: One level, Able to Live on Main level with bedroom/bathroom  Home Access: Stairs to enter with rails  Entrance Stairs - Number of Steps: 6-7 AARON with LHR  Bathroom Shower/Tub: Tub/Shower unit (pt typically stands)  Bathroom Toilet: Standard (sink nearby)  [employer]: Grab bars in shower  Bathroom Accessibility: Not accessible  Home Equipment: None  Has the patient had two or more falls in the past year or any fall with injury in the past year?: No  ADL Assistance: Independent  Homemaking Assistance: Independent  Ambulation Assistance: Independent (without AD)  Transfer Assistance: Independent  Active : Yes  Occupation: Self employed  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 Objective                Safety Devices  Type of Devices: Call light within reach;Nurse notified     Toilet Transfers  Toilet - Technique: Ambulating (without A device to/from bathroom)  Equipment Used: Standard toilet  Toilet Transfer: Independent  AROM: Within functional limits  Strength: Within functional limits  Coordination: Within functional limits  ADL  Grooming: Independent  UE Dressing: Independent (changing hospital gown)  LE Dressing: Independent (simulated pants)     Activity Tolerance  Activity Tolerance: Patient tolerated treatment well     Transfers  Sit to stand: Independent  Stand to sit:  Independent  Vision  Vision: Impaired  Vision Exceptions: Wears glasses for reading  Hearing  Hearing: Within functional limits  Cognition  Overall Cognitive Status: WFL  Orientation  Overall Orientation Status: Within Functional Limits                  Education Given To: Patient  Education Provided: Role of Therapy  Education Method: Verbal  Barriers to Learning: None  Education Outcome: Verbalized understanding           Hand Dominance  Hand Dominance: Right            G-Code     OutComes Score                                                  AM-PAC Score        AM-PAC Inpatient Daily Activity Raw Score: 24 (01/06/23 1545)  AM-PAC Inpatient ADL T-Scale Score : 57.54 (01/06/23 1545)  ADL Inpatient CMS 0-100% Score: 0 (01/06/23 1545)  ADL Inpatient CMS G-Code Modifier : CH (01/06/23 1545)                     Therapy Time   Individual Concurrent Group Co-treatment   Time In 1320         Time Out 1332         Minutes 12             Timed Code Treatment Minutes:   4    Total Treatment Minutes:   Donna 70, OTR/L 94 31 11

## 2023-01-06 NOTE — PROGRESS NOTES
Progress Note    Admit Date: 1/5/2023  Day: 2  Diet: Diet NPO    CC: Chest discomfort; dizziness    Interval history:   - Patient brought up onto unit; has had no neurological symptoms since  - AAOx3 with no current symptoms  - Vital signs mostly stable - blood pressure has come down since admission. Now wnl. Medications:     Scheduled Meds:   enoxaparin  30 mg SubCUTAneous BID    nitroglycerin  1 inch Topical 4 times per day    sodium chloride flush  5-40 mL IntraVENous 2 times per day    pantoprazole  40 mg IntraVENous Daily     Continuous Infusions:   sodium chloride       PRN Meds:sodium chloride flush, sodium chloride, polyethylene glycol, acetaminophen **OR** acetaminophen, ondansetron    Objective:   Vitals:   T-max:  Patient Vitals for the past 8 hrs:   BP Temp Temp src Pulse Resp SpO2   01/06/23 0633 116/68 98 °F (36.7 °C) Oral 78 16 96 %   01/06/23 0230 125/69 98 °F (36.7 °C) Oral 89 18 96 %       Intake/Output Summary (Last 24 hours) at 1/6/2023 1138  Last data filed at 1/6/2023 0834  Gross per 24 hour   Intake 10 ml   Output --   Net 10 ml       Review of Systems   Constitutional:  Negative for chills, fatigue and fever. Respiratory:  Negative for cough and shortness of breath. Cardiovascular:  Negative for chest pain. Gastrointestinal:  Negative for constipation, diarrhea, nausea and vomiting. Musculoskeletal:  Negative for back pain. Physical Exam  Constitutional:       Appearance: He is obese. Cardiovascular:      Rate and Rhythm: Normal rate and regular rhythm. Pulses: Normal pulses. Heart sounds: Normal heart sounds. Pulmonary:      Effort: Pulmonary effort is normal.      Breath sounds: Normal breath sounds. Abdominal:      General: Abdomen is flat. Palpations: Abdomen is soft. Neurological:      General: No focal deficit present. Mental Status: He is alert and oriented to person, place, and time.        LABS:    CBC:   Recent Labs     01/05/23  1445 01/06/23  0551   WBC 6.3 6.9   HGB 13.1* 13.1*   HCT 42.0 41.4    232   MCV 74.2* 74.5*     Renal:    Recent Labs     01/05/23  1445 01/06/23  0551    138   K 4.3 4.7    101   CO2 24 23   BUN 18 15   CREATININE 0.8* 0.9   GLUCOSE 123* 115*   CALCIUM 9.6 9.6   ANIONGAP 13 14     Hepatic:   Recent Labs     01/05/23  1445   AST 17   ALT 18   BILITOT 0.3   PROT 7.1   LABALBU 4.6   ALKPHOS 56     Troponin:   Recent Labs     01/05/23  1445   TROPONINI <0.01     BNP: No results for input(s): BNP in the last 72 hours. Lipids: No results for input(s): CHOL, HDL in the last 72 hours. Invalid input(s): LDLCALCU, TRIGLYCERIDE  ABGs:  No results for input(s): PHART, TKU0HIW, PO2ART, GDP2OSJ, BEART, THGBART, F9BMFYZR, HOZ5OSJ in the last 72 hours. INR: No results for input(s): INR in the last 72 hours. Lactate: No results for input(s): LACTATE in the last 72 hours. Cultures:  -----------------------------------------------------------------  RAD:   CTA HEAD NECK W CONTRAST   Final Result   1. Normal right internal carotid artery. .   2. Normal left internal carotid artery   3. Normal vertebral arteries. 4. Normal intracranial circulation. CT HEAD WO CONTRAST   Final Result   1. Normal brain   2. Left maxillary sinus retention cyst          Assessment/Plan:   OB is a 51 y/o with a PMHx of DMII and Hypertension who presents to [facility] complaining of an episodes of chest discomfort, numbness and dizziness. Chest Pain - Patient gets transient episodes of chest pain that start on his back left and migrate over to the left anterior portion of his chest. They occur when he is sitting up and often occurs while driving. He often gets left sided numbness. He came to the ED because his lip started twitching and he was worried about it being a stroke. Had a calcium scoring T test on 5/12/22 which showed absence of coronary artery calcification.  Per Neuro, cause of neurological findings likely cardiac or GI in nature. CT/CTA of head intact. - Consult Cardiology; may warrant ischemic workup  - Lipid panel ordered; waiting on results  - Consider starting on Statin if lipid results abnormal    2. Panic Disorder - When patient starts to get chest pain, he states he gets extremely nervous and starts breathing rapidly. At that point, he develops the left sided numbness.   - Consider starting an SSRI    Chronic Medical Problems  DMII - LDSS (recheck A1C)  2. Hypertension - On amlodipine  3. Morbid Obesity    Code Status: Full Code  FEN: NPO for procedures  PPX: Protonix;  Lovenox   DISPO: Ilir Burgos MD, PGY-1  01/06/23  9:39 AM    This patient has been staffed and discussed with Joselyn Gurrola MD.

## 2023-01-06 NOTE — PLAN OF CARE
4 Eyes Skin Assessment     NAME:  Ligia Malcolm  YOB: 1970  MEDICAL RECORD NUMBER:  [id number]    The patient is being assessed for  Admission    I agree that One RN have performed a thorough Head to Toe Skin Assessment on the patient. ALL assessment sites listed below have been assessed. Areas assessed by both nurses:    Head, Face, Ears, Shoulders, Back, Chest, Arms, Elbows, Hands, Sacrum. Buttock, Coccyx, Ischium, and Legs. Feet and Heels        Does the Patient have a Wound?  No noted wound(s)       Deric Prevention initiated by RN: Yes   Wound Care Orders initiated by RN: No    Pressure Injury (Stage 3,4, Unstageable, DTI, NWPT, and Complex wounds) if present place referral order by RN under : No    New and Established Ostomies, if present place, referral order under : No      Nurse 1 eSignature: Electronically signed by Army Evie RN on 1/5/23 at 11:30 PM EST    **SHARE this note so that the co-signing nurse is able to place an eSignature**    Nurse 2 eSignature: Electronically signed by Yahir Sood RN on 1/6/23 at 3:11 AM EST

## 2023-01-06 NOTE — CONSULTS
Neurology / Raymond Stokes Note    Sergey Jacobsen MD is requesting this consult. Reason for Consult: TIA symptoms  Admission Chief Complaint: left chest and back pressure    History of Present Illness     Dharmesh Wheeler is a 50 y.o. y/o male with PMH significant for HTN, HLD who presented with episode of left back and chest pressure and left lip twitching. Per my interview with the patient, he has been dealing with this issue for over a year. He states he has a more constant pressure below his left shoulder blade. Sometimes this pressure wraps around his lateral chest and can travel to his left arm. He often describes a left arm heaviness during these episodes. He thinks anxiety exacerbates the severity of these episodes. Episodes are exacerbated by sitting up and eating and relieved by standing or laying down. He can start to feel them coming on and sometimes he can roll down his car window and the fresh air helps. Two weeks ago, he had trouble with his lumbar spine with shooting electrical like pain in his legs but he states his chest discomfort is nothing like this. REVIEW OF SYSTEMS:   Constitutional- No weight loss or fevers   Eyes- No diplopia. No photophobia. Ears/nose/throat- No dysphagia. No Dysarthria   Cardiovascular- No palpitations. +chest pain  Respiratory- No dyspnea. No Cough   Gastrointestinal- No Abdominal pain. No Vomiting. +early satiety   Genitourinary- No incontinence. No urinary retention   Musculoskeletal- No myalgia. No arthralgia   Skin- No rash. No easy bruising. Psychiatric- No depression. +anxiety  Endocrine- No diabetes. No thyroid issues. Hematologic- No bleeding difficulty. No fatigue   Neurologic- no weakness    Past Medical, Surgical, Family, and Social History   PAST MEDICAL HISTORY:  Past Medical History:   Diagnosis Date    Diabetes mellitus ([facility] [address]     Hyperlipidemia     Hypertension      SURGICAL HISTORY:  History reviewed.  No pertinent surgical history. FAMILY HISTORY & SOCIAL HISTORY:  Family history non-contributory  History reviewed. No pertinent family history. Social History     Tobacco Use    Smoking status: Never    Smokeless tobacco: Never   Substance Use Topics    Alcohol use: Yes     Comment: social    Drug use: Never         Allergies & Outpatient Medications   ALLERGIES:  No Known Allergies  HOME MEDICATIONS:  Current Discharge Medication List        CONTINUE these medications which have NOT CHANGED    Details   amLODIPine (NORVASC) 5 MG tablet TAKE 1 TABLET BY MOUTH DAILY      atorvastatin (LIPITOR) 80 MG tablet TAKE 1 TABLET BY MOUTH DAILY      JARDIANCE 10 MG tablet TAKE 1 TABLET BY MOUTH EVERY MORNING      FEROSUL 325 (65 Fe) MG tablet TAKE 1 TABLET BY MOUTH DAILY      lisinopril (PRINIVIL;ZESTRIL) 20 MG tablet Take 20 mg by mouth daily      metFORMIN (GLUCOPHAGE) 500 MG tablet Take 500 mg by mouth 2 times daily (with meals)      omeprazole (PRILOSEC) 40 MG delayed release capsule Take 40 mg by mouth daily      ibuprofen (IBU) 800 MG tablet Take 1 tablet by mouth every 8 hours as needed for Pain  Qty: 20 tablet, Refills: 0      lidocaine (LIDODERM) 5 % Place 1 patch onto the skin daily for 10 days 12 hours on, 12 hours off.   Qty: 10 patch, Refills: 0           STOP taking these medications       methocarbamol (ROBAXIN-750) 750 MG tablet Comments:   Reason for Stopping:                 Physical Exam   PHYSICAL EXAM:  Vitals:    01/05/23 1830 01/05/23 2145 01/06/23 0230 01/06/23 0633   BP: (!) 148/80 108/86 125/69 116/68   Pulse: 84 80 89 78   Resp: 20 16 18 16   Temp:  98 °F (36.7 °C) 98 °F (36.7 °C) 98 °F (36.7 °C)   TempSrc:  Oral Oral Oral   SpO2: 95% 99% 96% 96%   Weight:       Height:             General: Alert, no distress, well-nourished  Neurologic  Mental status:   orientation to person, place, time, situation   Attention intact as able to attend well to the exam     Language fluent in conversation   Comprehension intact; follows simple commands    Cranial nerves:   CN2: Visual fields full w/o extinction on confrontational testing   CN 3,4,6: Pupils equal and reactive to light, extraocular muscles intact  CN5: Facial sensation symmetric   CN7: Face symmetric bilaterally   CN8: Hearing symmetric to spoken voice  CN9: Palate elevated symmetrically  CN11: Traps full strength on shoulder shrug  CN12: Tongue midline with protrusion    Motor Exam:   R  L    Deltoid 5  5   Biceps 5 5   Triceps 5 5   Interossei 5 5      R  L    Hip flexion  5  5   Knee flexion  5 5   Knee extension  5 5   Ankle dorsiflexion  5 5   Ankle plantar flexion  5 5         Sensory: light touch intact and symmetric in all 4 extremities. OTHER SYSTEMS:  Cardiovascular: Warm, appears well perfused. No rubs, gallops, or murmurs on auscultation   Respiratory: Easy, non-labored respiratory pattern   Abdominal: Abdomen is without distention   Extremities: Upper and lower extremities are atraumatic in appearance without deformity. No swelling or erythema. Psychiatric: Cooperative with exam    Diagnostic Testing Results   IMAGES:  Images personally reviewed and agree w/ radiology interpretation. Head CT w/o Contrast:  1. Normal brain 2. Left maxillary sinus retention cyst     CTA of Head / Neck w/ Contrast:  1. Normal right internal carotid artery. . 2. Normal left internal carotid artery 3. Normal vertebral arteries. 4. Normal intracranial circulation. LABS:  All results below personally reviewed. Pertinent positives & negatives are addressed in Impression & Recommendations below.      LABS   Metabolic Panel Recent Labs     01/05/23  1445 01/06/23  0551    138   K 4.3 4.7    101   CO2 24 23   BUN 18 15   CREATININE 0.8* 0.9   GLUCOSE 123* 115*   CALCIUM 9.6 9.6   LABALBU 4.6  --    ALKPHOS 56  --    ALT 18  --    AST 17  --       CBC / Coags Recent Labs     01/05/23  1445 01/06/23  0551   WBC 6.3 6.9   RBC 5.66 5.56   HGB 13.1* 13.1*   HCT 42.0 41.4    232      Other No results for input(s): LABA1C, LDLCALC, TRIG, TSH, TSOESFKE31, FOLATE, LABSALI, COVID19 in the last 72 hours. No results for input(s): PHENYTOIN, KEPPRA, LACOSA, LAMO, VALPROATE, LACTSEPSIS, LACTA in the last 72 hours. CURRENT SCHEDULED MEDICATIONS   Inpatient Medications     enoxaparin, 30 mg, SubCUTAneous, BID    nitroglycerin, 1 inch, Topical, 4 times per day    sodium chloride flush, 5-40 mL, IntraVENous, 2 times per day    pantoprazole, 40 mg, IntraVENous, Daily   Infusions    sodium chloride        Antibiotics   Recent Abx Admin        No antibiotic orders with administrations found. IMPRESSION & RECOMMENDATIONS     IMPRESSION: 50year old man with chief complaint of left chest pressure and left lip twitching with subjective heaviness of the left arm. He has had no focal neurologic deficits and his presentation is more consistent with cardiac or GI etiology. Also possible he is having panic attacks.     RECOMMENDATIONS:    -Suggest continued cardiac and GI workup   -We will sign off      [address] [facility], [place] - CNP   Neurology & Neurocritical Care   1/6/2023 7:51 AM        Floor / PCU Patients:  Neurology Line: [phone]  [employer]: [facility] Neurology

## 2023-01-06 NOTE — PROGRESS NOTES
Pharmacist Review and Automatic Dose Adjustment of Prophylactic Enoxaparin    The reviewing pharmacist has made an adjustment to the ordered enoxaparin dose or converted to UFH per the approved [facility] protocol and table as defined below. Plan / Rationale: Based upon the patient's weight and renal function, the ordered dose of 40 mg daily has been converted to 30 mg BID. Thank you,  Shasha Christianson, PharmD, BCPS  1/6/2023, 12:57 AM      Dharmesh Wheeler is a 50 y.o. male. Recent Labs     01/05/23  1445   CREATININE 0.8*       Estimated Creatinine Clearance: 151 mL/min (A) (based on SCr of 0.8 mg/dL (L)). Recent Labs     01/05/23  1445   HGB 13.1*   HCT 42.0        No results for input(s): INR in the last 72 hours.     Height:   Ht Readings from Last 1 Encounters:   01/05/23 5' 9\" (1.753 m)     Weight:  Wt Readings from Last 1 Encounters:   01/05/23 (!) 300 lb 7 oz (136.3 kg)

## 2023-01-06 NOTE — PLAN OF CARE
Problem: Discharge Planning  Goal: Discharge to home or other facility with appropriate resources  1/6/2023 0856 by Adonay Swenson RN  Outcome: Progressing  Flowsheets (Taken 1/5/2023 2158 by Jigar Bautista RN)  Discharge to home or other facility with appropriate resources:   Identify barriers to discharge with patient and caregiver   Arrange for needed discharge resources and transportation as appropriate   Identify discharge learning needs (meds, wound care, etc)   Arrange for interpreters to assist at discharge as needed   Refer to discharge planning if patient needs post-hospital services based on physician order or complex needs related to functional status, cognitive ability or social support system  1/5/2023 2158 by April [address], RN  Flowsheets (Taken 1/5/2023 2158)  Discharge to home or other facility with appropriate resources:   Identify barriers to discharge with patient and caregiver   Arrange for needed discharge resources and transportation as appropriate   Identify discharge learning needs (meds, wound care, etc)   Arrange for interpreters to assist at discharge as needed   Refer to discharge planning if patient needs post-hospital services based on physician order or complex needs related to functional status, cognitive ability or social support system     Problem: Skin/Tissue Integrity  Goal: Absence of new skin breakdown  Description: 1. Monitor for areas of redness and/or skin breakdown  2. Assess vascular access sites hourly  3. Every 4-6 hours minimum:  Change oxygen saturation probe site  4. Every 4-6 hours:  If on nasal continuous positive airway pressure, respiratory therapy assess nares and determine need for appliance change or resting period.   Outcome: Progressing  Note: Pt turns self, educated on skin breakdown      Problem: Pain  Goal: Verbalizes/displays adequate comfort level or baseline comfort level  Outcome: Progressing

## 2023-01-06 NOTE — CARE COORDINATION
CM spoke with patient at bedside. He is from home with s/o, independent pta, drives self. No CM needs at this time. Please call or consult if needs arise.     Martin Hernandez, RN, BSN,   4th Floor Progressive Care Unit  [phone]

## 2023-01-06 NOTE — PROGRESS NOTES
Pt resting in bed with family at bedside. Stable throughout shift, denied any pain, chest pressure or shortness of breath. Spoke with OT and PT, signed off pt being on chair or bed alarm, pt is A&Ox4 with good judgement and safety awareness, pt is able to call out when needed to ambulate. Medications adjusted to home doses. Pt is independent with no device for ambulation, continent of bowel and bladder. Denied any further needs at time. Call device within reach.

## 2023-01-06 NOTE — CONSULTS
[facility]  Cardiology Inpatient Consult Service                                                                                          Pt Name: Sinan Deng  Age: 50 y.o. Sex: male  : 1970  Location: [facility]    Referring Physician: Jose Samuel MD      Reason for Consult:       Reason for Consultation/Chief Complaint: chest pain      HPI:      Sinan Deng is a 50 y.o. male with a past medical history of T2DM, HLD, and HTN who presented to the hospital with chest pain. Patient describes the sensation as more of a \"chest pressure rather than chest pain\". It occurs along the lateral ribs on his left side. He says the pressure sensation usually starts in his back and then wraps forwards. It is a dull, not sharp or stabbing, sensation. He says it it worse when sitting. The pain is relieved by either standing or laying down. The episodes last 10 - 20 min and he does not take medication to relive his symptoms, he just changes position. This has been going on for 1 - 2 years. When these episodes occur, he has pain that goes up the left side of his neck to the bottom of his head and causes a headache in the area as well as dizziness. He has no vision or hearing changes. He thinks he might occasionally slur his speech, but it's totally sure. He reports sometimes feeling a little numb on the left side of his face during the episodes. Yesterday, he was experiencing some left sided lower lip twitching when he was having this left sided head and neck pain. Patient reports these episodes make him quite anxious and he feels the anxiety starting to set in the moment he thinks he's about to have an episode. He thinks he might be short of breath during some of these episodes. Patient has had extensive cardiac work up in the past for this chest discomfort. Patient has a cardiac calcium score of 0, which corresponds to the absence of coronary artery calcification.  Stress test in  showed normal myocardial perfusion. Today, patient is not currently experiencing any chest discomfort or head or neck pain. He denies SOB, headaches, dizziness, blurry vision, chest pain, N/V, abdominal pain, diarrhea, constipation, or dysuria. Histories     Past Medical History:   has a past medical history of Diabetes mellitus ([address] Hyperlipidemia, and Hypertension. Surgical History:   has no past surgical history on file. Social History:   reports that he has never smoked. He has never used smokeless tobacco. He reports current alcohol use. He reports that he does not use drugs. Family History:  No evidence for sudden cardiac death. Patient reports that mother  of a stroke approximately 2 years ago. Medications:       Home Medications  Were reviewed and are listed in nursing record. and/or listed below  Prior to Admission medications    Medication Sig Start Date End Date Taking?  Authorizing Provider   amLODIPine (NORVASC) 5 MG tablet TAKE 1 TABLET BY MOUTH DAILY 22   Historical Provider, MD   atorvastatin (LIPITOR) 80 MG tablet TAKE 1 TABLET BY MOUTH DAILY 22   Historical Provider, MD   JARDIANCE 10 MG tablet TAKE 1 TABLET BY MOUTH EVERY MORNING 10/14/22   Historical Provider, MD   FEROSUL 325 (65 Fe) MG tablet TAKE 1 TABLET BY MOUTH DAILY 22   Historical Provider, MD   lisinopril (PRINIVIL;ZESTRIL) 20 MG tablet Take 20 mg by mouth daily    Historical Provider, MD   metFORMIN (GLUCOPHAGE) 500 MG tablet Take 500 mg by mouth 2 times daily (with meals)    Historical Provider, MD   omeprazole (PRILOSEC) 40 MG delayed release capsule Take 40 mg by mouth daily 22   Historical Provider, MD   ibuprofen (IBU) 800 MG tablet Take 1 tablet by mouth every 8 hours as needed for Pain 22  Mendel Laser, MD   lidocaine (LIDODERM) 5 % Place 1 patch onto the skin daily for 10 days 12 hours on, 12 hours off. 22  Mendel Laser, MD Inpatient Medications:   enoxaparin  30 mg SubCUTAneous BID    nitroglycerin  1 inch Topical 4 times per day    sodium chloride flush  5-40 mL IntraVENous 2 times per day    pantoprazole  40 mg IntraVENous Daily       IV drips:   sodium chloride         PRN:  sodium chloride flush, sodium chloride, polyethylene glycol, acetaminophen **OR** acetaminophen, ondansetron    Allergy:     Patient has no known allergies. Review of Systems:     All 12 point review of symptoms completed and findings described in HPI. Physical Examination:     Vitals:    01/05/23 2145 01/06/23 0230 01/06/23 0633 01/06/23 1257   BP: 108/86 125/69 116/68 136/86   Pulse: 80 89 78 81   Resp: 16 18 16 18   Temp: 98 °F (36.7 °C) 98 °F (36.7 °C) 98 °F (36.7 °C) 97.8 °F (36.6 °C)   TempSrc: Oral Oral Oral Oral   SpO2: 99% 96% 96% 97%   Weight:       Height:           Wt Readings from Last 3 Encounters:   01/05/23 (!) 300 lb 7 oz (136.3 kg)   12/29/22 298 lb 14.4 oz (135.6 kg)       Objective:  Vital signs: (most recent): Blood pressure 136/86, pulse 81, temperature 97.8 °F (36.6 °C), temperature source Oral, resp. rate 18, height 5' 9\" (1.753 m), weight (!) 300 lb 7 oz (136.3 kg), SpO2 97 %. General Appearance:  Alert, cooperative, no distress, appears stated age. Obese.    Head:  Normocephalic, without obvious abnormality, atraumatic   Eyes:  PERRL, conjunctiva/corneas clear EOM intact  Ears normal   Throat no lesions       Nose: Nares normal, no drainage or sinus tenderness   Throat: Lips, mucosa, and tongue normal   Neck: Supple, symmetrical, trachea midline, no adenopathy, thyroid: not enlarged, symmetric, no tenderness/mass/nodules, no carotid bruit or JVD       Lungs:   Clear to auscultation bilaterally, respirations unlabored   Chest Wall:  No tenderness or deformity   Heart:  Regular rate and rhythm, S1, S2 normal, no murmur, rub or gallop PMI intact   Abdomen:   Soft, non-tender, bowel sounds active all four quadrants, no masses, no organomegaly       Extremities: Extremities normal, atraumatic, no cyanosis or edema   Pulses: 2+ and symmetric   Skin: Skin color, texture, turgor normal, no rashes or lesions   Pysch: Normal mood and affect   Neurologic: Normal gross motor and sensory exam.  Cranial nerves intact        Labs:     Recent Labs     01/05/23  1445 01/06/23  0551    138   K 4.3 4.7   BUN 18 15   CREATININE 0.8* 0.9    101   CO2 24 23   GLUCOSE 123* 115*   CALCIUM 9.6 9.6     Recent Labs     01/05/23  1445 01/06/23  0551   WBC 6.3 6.9   HGB 13.1* 13.1*   HCT 42.0 41.4    232   MCV 74.2* 74.5*     Recent Labs     01/06/23  0551   TRIG 56   HDL 65*       Recent Labs     01/05/23  1445   TROPONINI <0.01       Recent Labs     01/06/23  0551   CHOL 149   HDL 65*   LDLCALC 73   TRIG 56   ]    Lab Results   Component Value Date    TROPONINI <0.01 01/05/2023       Imaging:     I personally reviewed imaging studies including CXR, Stress test, TTE/BRANDEN. Last ECG - personally interpreted EKG:  I have reviewed EKG with the following interpretation  NSR, no ST-T changes. Last Stress 1/6/2023:  There is normal isotope uptake at stress and rest. There is no evidence of myocardial ischemia or scar. The LVEF is normal and the LV wall motion is normal. This is a low risk cardiac scan. Last TTE 1/6/23:  Left ventricular cavity size is normal. Normal left ventricular wall thickness. Overall left ventricular systolic function appears normal with an ejection fraction of 55-60%. No regional wall motion abnormalities are noted. Normal diastolic function. Estimated pulmonary artery systolic pressure is at 31 mmHg assuming a right atrial pressure of 3 mmHg.     CT cardiac calcium score screen 6/20/22:  CORONARY ARTERY CALCIUM SCORE:   ARTERY    SCORE     LM                0   LAD              0   CX                0   RCA             0     TOTAL         0   Total calcium score is 0 which corresponds to absence of coronary artery calcification. THORACIC AORTA:  Unremarkable. No aneurysm   Ascending Aorta: 3.4 cm  (normal is less than 4 cm)   Descending Aorta: 2.4 cm  (normal is less than 3.5 cm)       Assessment / Plan:   Chest pain, atypical  Patient's chest pain starts while sitting up and is self limited. Previous work up has not showed signs of ischemia. Patient experiences significant anxiety with these episodes. CTA showed normal right and left internal carotid arteries, normal vertebral arteries, and normal intracranial circulation.  -Lexiscan  -Echo  -pain management     HTN  -continue home amlodipine 5mg    T2DM  Last HbA1C 6.9 on 1/6/23  -home meds: metformin 1g BID and Jardiance 10mg daily  -POC gluc checks and need for insulin management by primary team    HLD  -continue home Lipitor 80mg      Cande Tejada MD, PGY-1  Internal Medicine   1/6/2023     Patient has been staffed and discussed with attending physician Pily Wilcox. Salome Rodriguez MD.        Staff Note      Patient seen and evaluated with the medical resident. Atypical chest pain. I was physically present during the critical portions of the service when performed by the resident including the assessment and management of the patient. If myoview is negative for ischemia then pt may be discharged to home. I agree with the findings and plans as described.

## 2023-01-06 NOTE — H&P
Internal Medicine  PGY-II  History & Physical      CC : Chest discomfort, dizziness    History Obtained From:  patient, electronic medical record    HISTORY OF PRESENT ILLNESS:  61-year-old male with past medical history of type 2 diabetes, history of hypertension, history of anemia presented to the emergency department today due to lightheadedness and dizziness and left facial numbness and weakness, chest pain. At the OSH Emergency department labs done revealed normal D-dimer, CMP which was within normal limits CBC was done which showed hemoglobin of 13.1, troponin was 0.01, blood glucose was 131, CTA of the head and neck was within normal limits, CT head without contrast was normal, x-ray of the lumbar spine were done in the emergency department which were within normal limits    Patient was transferred to the [facility]. for further evaluation to work his symptoms concerning for chest pain, TIA     On my evaluation patient told me that since the past 6 months he has been having episodes he describes as back discomfort which he describes as a knot which moves  from his back to his abdomen and then to back of his head these episodes last for 20 minutes. 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 Yesterday he had a similar episode and asked his fiance to drive him to the ED after he used the restroom at home. He denies and numbness, Tingling or weakness no loss of control of bowel or bladders, no vision changes. In addition although he acknowledged that he had chest discomfort at the OSH ED but now is not having chest discomfort or chest pain, Denied shortness of breath, abdominal pain, nausea or vomiting. Past Medical History:        Diagnosis Date    Diabetes mellitus ([address]     Hyperlipidemia     Hypertension        Past Surgical History:    History reviewed. No pertinent surgical history.     Medications Priorto Admission: Medications Prior to Admission: amLODIPine (NORVASC) 5 MG tablet, TAKE 1 TABLET BY MOUTH DAILY  atorvastatin (LIPITOR) 80 MG tablet, TAKE 1 TABLET BY MOUTH DAILY  JARDIANCE 10 MG tablet, TAKE 1 TABLET BY MOUTH EVERY MORNING  FEROSUL 325 (65 Fe) MG tablet, TAKE 1 TABLET BY MOUTH DAILY  lisinopril (PRINIVIL;ZESTRIL) 20 MG tablet, Take 20 mg by mouth daily  metFORMIN (GLUCOPHAGE) 500 MG tablet, Take 500 mg by mouth 2 times daily (with meals)  omeprazole (PRILOSEC) 40 MG delayed release capsule, Take 40 mg by mouth daily  methocarbamol (ROBAXIN-750) 750 MG tablet, Take 1 tablet by mouth 3 times daily as needed (muscle spasms)  ibuprofen (IBU) 800 MG tablet, Take 1 tablet by mouth every 8 hours as needed for Pain  lidocaine (LIDODERM) 5 %, Place 1 patch onto the skin daily for 10 days 12 hours on, 12 hours off. Allergies:  Patient has no known allergies. Social History:   TOBACCO:   reports that he has never smoked. He has never used smokeless tobacco.  ETOH:   reports current alcohol use. DRUGS :Patient currently lives with family Fiance     Family History:   History reviewed. No pertinent family history. ROS: A 10 point review of systems was conducted, significant findings as noted in HPI. Physical Exam  Vitals reviewed. Constitutional:       Interventions: He is not intubated. HENT:      Head: Normocephalic. Eyes:      Extraocular Movements: Extraocular movements intact. Pupils: Pupils are equal, round, and reactive to light. Cardiovascular:      Rate and Rhythm: Normal rate and regular rhythm. Pulmonary:      Effort: Pulmonary effort is normal. No tachypnea, bradypnea, accessory muscle usage, prolonged expiration, respiratory distress or retractions. He is not intubated. Breath sounds: Normal breath sounds and air entry. Abdominal:      General: Abdomen is flat and protuberant. Bowel sounds are normal.   Musculoskeletal:         General: Normal range of motion.       Cervical back: Normal range of motion. Neurological:      General: No focal deficit present. Mental Status: He is alert and oriented to person, place, and time. Psychiatric:         Mood and Affect: Mood normal.         Behavior: Behavior normal.          Vitals:    01/05/23 2145   BP: 108/86   Pulse: 80   Resp: 16   Temp: 98 °F (36.7 °C)   SpO2: 99%       DATA:    Labs:  CBC:   Recent Labs     01/05/23  1445   WBC 6.3   HGB 13.1*   HCT 42.0          BMP:   Recent Labs     01/05/23  1445      K 4.3      CO2 24   BUN 18   CREATININE 0.8*   GLUCOSE 123*     LFT's:   Recent Labs     01/05/23  1445   AST 17   ALT 18   BILITOT 0.3   ALKPHOS 56     Troponin:   Recent Labs     01/05/23  1445   TROPONINI <0.01     BNP:No results for input(s): BNP in the last 72 hours. ABGs: No results for input(s): PHART, QKW8OOZ, PO2ART in the last 72 hours. INR: No results for input(s): INR in the last 72 hours. U/A:No results for input(s): NITRITE, COLORU, PHUR, LABCAST, WBCUA, RBCUA, MUCUS, TRICHOMONAS, YEAST, BACTERIA, CLARITYU, SPECGRAV, LEUKOCYTESUR, UROBILINOGEN, BILIRUBINUR, BLOODU, GLUCOSEU, AMORPHOUS in the last 72 hours. Invalid input(s): KETONESU    CTA HEAD NECK W CONTRAST   Final Result   1. Normal right internal carotid artery. .   2. Normal left internal carotid artery   3. Normal vertebral arteries. 4. Normal intracranial circulation. CT HEAD WO CONTRAST   Final Result   1. Normal brain   2. Left maxillary sinus retention cyst              ASSESSMENT AND PLAN:  #Concern for TIA  As per OSH ED patient had dizziness and numbness which resolved and  stroke team was never called due to resolution of Symptoms which started an hour prior to presentation to the ED  -Has dizziness, discomfort when sitting in the car which resolved within 20 minutes of onset which has been ongoing since the past 6 months.    - CTA/CT head negative   -Consult neurology for recommendations  -We will do every 4 neurochecks while inpatient  -Received aspirin 325 mg in the emergency department  - Q-4 Neuro checks  -Consult neurology for further recommendation, On call neurology aware of the patient. # Chest discomfort   -Potentially ischemic, versus acid reflux less likely pericarditis as no pain with inspiration expiration  - Chest pain Discomfort at frequent episodes in the past 6 months, chart review reveals  atypical chest pain ongoing since as back in 2021  - Patient had recently had a calcium scoring CT scan which was within normal limits showing no signs of ischemia saw her cardiologist back in May 2022  - Chart review reveals that patient has Hx of GERD  with last EGD done back in 2018 revealed some pallor but no esophagitis. - Will consult cardiology to evaluate given Symptoms ongoing as per chart review       # Type 2 diabetes  - Start low-dose sliding scale while inpatient, will check A1c, Last A1c 6.6    #Hypertension  - On amlodipine will hold given soft blood pressures     # Morbid obesity   - Body mass index is 44.37 kg/m². # JENAE/OHS   - Chart review reveals patient has Hx of sleep ?         Will discuss with attending physician Dr. Paredes Manzanita     Barriers to discharge: Cardiology and neurology recommendations     Code Status:Full code  FEN:NPO for procedures    PPX: Protonix, Lovenox pending Neurology recommendations   DISPO: Estuardo Ma MD  Internal Medicine, PGY-II   1/5/2023,  10:16 PM

## 2023-01-07 VITALS
OXYGEN SATURATION: 97 % | BODY MASS INDEX: 44.5 KG/M2 | SYSTOLIC BLOOD PRESSURE: 125 MMHG | WEIGHT: 300.44 LBS | TEMPERATURE: 98.1 F | HEIGHT: 69 IN | RESPIRATION RATE: 16 BRPM | HEART RATE: 80 BPM | DIASTOLIC BLOOD PRESSURE: 83 MMHG

## 2023-01-07 LAB
ANION GAP SERPL CALCULATED.3IONS-SCNC: 13 MMOL/L (ref 3–16)
BASOPHILS ABSOLUTE: 0.1 K/UL (ref 0–0.2)
BASOPHILS RELATIVE PERCENT: 0.7 %
BUN BLDV-MCNC: 15 MG/DL (ref 7–20)
CALCIUM SERPL-MCNC: 9.4 MG/DL (ref 8.3–10.6)
CHLORIDE BLD-SCNC: 102 MMOL/L (ref 99–110)
CO2: 19 MMOL/L (ref 21–32)
CREAT SERPL-MCNC: 0.9 MG/DL (ref 0.9–1.3)
EOSINOPHILS ABSOLUTE: 0.3 K/UL (ref 0–0.6)
EOSINOPHILS RELATIVE PERCENT: 3.7 %
GFR SERPL CREATININE-BSD FRML MDRD: >60 ML/MIN/{1.73_M2}
GLUCOSE BLD-MCNC: 124 MG/DL (ref 70–99)
GLUCOSE BLD-MCNC: 127 MG/DL (ref 70–99)
GLUCOSE BLD-MCNC: 131 MG/DL (ref 70–99)
HCT VFR BLD CALC: 45 % (ref 40.5–52.5)
HEMOGLOBIN: 14 G/DL (ref 13.5–17.5)
LYMPHOCYTES ABSOLUTE: 2.7 K/UL (ref 1–5.1)
LYMPHOCYTES RELATIVE PERCENT: 32.7 %
MCH RBC QN AUTO: 24.5 PG (ref 26–34)
MCHC RBC AUTO-ENTMCNC: 31.1 G/DL (ref 31–36)
MCV RBC AUTO: 78.7 FL (ref 80–100)
MONOCYTES ABSOLUTE: 1.1 K/UL (ref 0–1.3)
MONOCYTES RELATIVE PERCENT: 12.9 %
NEUTROPHILS ABSOLUTE: 4.1 K/UL (ref 1.7–7.7)
NEUTROPHILS RELATIVE PERCENT: 50 %
PDW BLD-RTO: 16.5 % (ref 12.4–15.4)
PERFORMED ON: ABNORMAL
PERFORMED ON: ABNORMAL
PLATELET # BLD: 216 K/UL (ref 135–450)
PMV BLD AUTO: 6.9 FL (ref 5–10.5)
POTASSIUM REFLEX MAGNESIUM: 5.1 MMOL/L (ref 3.5–5.1)
RBC # BLD: 5.72 M/UL (ref 4.2–5.9)
SODIUM BLD-SCNC: 134 MMOL/L (ref 136–145)
WBC # BLD: 8.2 K/UL (ref 4–11)

## 2023-01-07 PROCEDURE — 6360000002 HC RX W HCPCS: Performed by: STUDENT IN AN ORGANIZED HEALTH CARE EDUCATION/TRAINING PROGRAM

## 2023-01-07 PROCEDURE — 80048 BASIC METABOLIC PNL TOTAL CA: CPT

## 2023-01-07 PROCEDURE — 96372 THER/PROPH/DIAG INJ SC/IM: CPT

## 2023-01-07 PROCEDURE — 85025 COMPLETE CBC W/AUTO DIFF WBC: CPT

## 2023-01-07 PROCEDURE — 36415 COLL VENOUS BLD VENIPUNCTURE: CPT

## 2023-01-07 PROCEDURE — C9113 INJ PANTOPRAZOLE SODIUM, VIA: HCPCS | Performed by: STUDENT IN AN ORGANIZED HEALTH CARE EDUCATION/TRAINING PROGRAM

## 2023-01-07 PROCEDURE — 99214 OFFICE O/P EST MOD 30 MIN: CPT | Performed by: NURSE PRACTITIONER

## 2023-01-07 PROCEDURE — 2580000003 HC RX 258: Performed by: STUDENT IN AN ORGANIZED HEALTH CARE EDUCATION/TRAINING PROGRAM

## 2023-01-07 PROCEDURE — G0378 HOSPITAL OBSERVATION PER HR: HCPCS

## 2023-01-07 PROCEDURE — 6370000000 HC RX 637 (ALT 250 FOR IP)

## 2023-01-07 PROCEDURE — 96376 TX/PRO/DX INJ SAME DRUG ADON: CPT

## 2023-01-07 RX ORDER — METHOCARBAMOL 750 MG/1
750 TABLET, FILM COATED ORAL 3 TIMES DAILY PRN
Qty: 20 TABLET | Refills: 0 | Status: SHIPPED | OUTPATIENT
Start: 2023-01-07 | End: 2023-01-14

## 2023-01-07 RX ADMIN — SODIUM CHLORIDE, PRESERVATIVE FREE 10 ML: 5 INJECTION INTRAVENOUS at 08:59

## 2023-01-07 RX ADMIN — ENOXAPARIN SODIUM 30 MG: 100 INJECTION SUBCUTANEOUS at 08:59

## 2023-01-07 RX ADMIN — AMLODIPINE BESYLATE 5 MG: 5 TABLET ORAL at 08:59

## 2023-01-07 RX ADMIN — ATORVASTATIN CALCIUM 80 MG: 80 TABLET, FILM COATED ORAL at 08:59

## 2023-01-07 RX ADMIN — PANTOPRAZOLE SODIUM 40 MG: 40 INJECTION, POWDER, LYOPHILIZED, FOR SOLUTION INTRAVENOUS at 08:59

## 2023-01-07 ASSESSMENT — ENCOUNTER SYMPTOMS
VOMITING: 0
BACK PAIN: 0
SHORTNESS OF BREATH: 0
COUGH: 0
NAUSEA: 0

## 2023-01-07 NOTE — PROGRESS NOTES
Progress Note    Admit Date: 1/5/2023  Day: 3  Diet: ADULT DIET; Regular    CC: Chest discomfort; dizziness    Interval history:   - Cardiac workup shows no ischemia or structural changes; Echo showed EF of 55-60% with normal diastolic function  - VSS; no acute complaints overnight  - AAOx3; patient resting comfortably and eager to leave    Medications:     Scheduled Meds:   enoxaparin  30 mg SubCUTAneous BID    amLODIPine  5 mg Oral Daily    atorvastatin  80 mg Oral Daily    nitroglycerin  1 inch Topical 4 times per day    sodium chloride flush  5-40 mL IntraVENous 2 times per day    pantoprazole  40 mg IntraVENous Daily     Continuous Infusions:   sodium chloride       PRN Meds:sodium chloride flush, sodium chloride, polyethylene glycol, acetaminophen **OR** acetaminophen, ondansetron    Objective:   Vitals:   T-max:  Patient Vitals for the past 8 hrs:   BP Temp Temp src Pulse Resp SpO2   01/07/23 0600 128/82 98 °F (36.7 °C) Oral 74 16 95 %   01/07/23 0315 (!) 142/76 98.2 °F (36.8 °C) Oral 60 16 96 %       Intake/Output Summary (Last 24 hours) at 1/7/2023 0934  Last data filed at 1/7/2023 0000  Gross per 24 hour   Intake 780 ml   Output --   Net 780 ml       Review of Systems   Constitutional:  Negative for chills, fatigue and fever. Respiratory:  Negative for cough and shortness of breath. Cardiovascular:  Negative for chest pain. Gastrointestinal:  Negative for nausea and vomiting. Musculoskeletal:  Negative for back pain. Neurological:  Negative for headaches. Physical Exam  Cardiovascular:      Rate and Rhythm: Normal rate and regular rhythm. Pulses: Normal pulses. Heart sounds: Normal heart sounds. Pulmonary:      Effort: Pulmonary effort is normal.      Breath sounds: Normal breath sounds. Abdominal:      General: Abdomen is flat. Palpations: Abdomen is soft. Neurological:      Mental Status: He is alert and oriented to person, place, and time.        LABS:    CBC:   Recent Labs     01/05/23  1445 01/06/23  0551 01/07/23  0653   WBC 6.3 6.9 8.2   HGB 13.1* 13.1* 14.0   HCT 42.0 41.4 45.0    232 216   MCV 74.2* 74.5* 78.7*     Renal:    Recent Labs     01/05/23  1445 01/06/23  0551 01/07/23  0653    138 134*   K 4.3 4.7 5.1    101 102   CO2 24 23 19*   BUN 18 15 15   CREATININE 0.8* 0.9 0.9   GLUCOSE 123* 115* 131*   CALCIUM 9.6 9.6 9.4   ANIONGAP 13 14 13     Hepatic:   Recent Labs     01/05/23  1445   AST 17   ALT 18   BILITOT 0.3   PROT 7.1   LABALBU 4.6   ALKPHOS 56     Troponin:   Recent Labs     01/05/23  1445   TROPONINI <0.01     BNP: No results for input(s): BNP in the last 72 hours. Lipids:   Recent Labs     01/06/23  0551   CHOL 149   HDL 65*     ABGs:  No results for input(s): PHART, DJI4OXQ, PO2ART, PMF7ZKI, BEART, THGBART, L6GROIPN, EAR6GQH in the last 72 hours. INR: No results for input(s): INR in the last 72 hours. Lactate: No results for input(s): LACTATE in the last 72 hours. Cultures:  -----------------------------------------------------------------  RAD:   NM MYOCARDIAL SPECT REST EXERCISE OR RX   Final Result      CTA HEAD NECK W CONTRAST   Final Result   1. Normal right internal carotid artery. .   2. Normal left internal carotid artery   3. Normal vertebral arteries. 4. Normal intracranial circulation. CT HEAD WO CONTRAST   Final Result   1. Normal brain   2. Left maxillary sinus retention cyst          Assessment/Plan:   OB is a 49 y/o with a PMHx of DMII and Hypertension who presents to [facility] complaining of an episodes of chest discomfort, numbness and dizziness. Atypical Chest Pain - Patient has pain the left side of his chest that does not come on with exertion or is relieved by rest. Patient gets transient episodes of chest pain that start on his back left and migrate over to the left anterior portion of his chest. They occur when he is sitting up and often occurs while driving. He often gets left sided numbness.  He came to the ED because his lip started twitching and he was worried about it being a stroke. Had a calcium scoring T test on 5/12/22 which showed absence of coronary artery calcification. Per Neuro, cause of neurological findings likely cardiac or GI in nature. CT/CTA of head intact. Per cardio, no signs of ischemia or structural issues. Spoke to patient about whether he has anxiety or not and how it affects his symptoms. He says he does not have any fear about upcoming episodes and does not avoid driving, which is when they come on. He just gets nervous when he feels the pain. I do not think it is appropriate to start him on an SSRI for this type of anxiety, but have encouraged him to bring up the idea with his PCP. Importantly, the patient can be discharged today. Chronic Medical Problems  DMII - LDSS (recheck A1C)  2.    Hypertension - On amlodipine    Code Status: Full Code  FEN: NPO for procedures  PPX: protonix; lovenox  DISPO: Okay for discharge    Raine Rubin MD, PGY-1  01/07/23  9:34 AM    This patient has been staffed and discussed with Christi Hall MD.

## 2023-01-07 NOTE — PLAN OF CARE
Problem: Skin/Tissue Integrity  Goal: Absence of new skin breakdown  Description: 1. Monitor for areas of redness and/or skin breakdown  2. Assess vascular access sites hourly  3. Every 4-6 hours minimum:  Change oxygen saturation probe site  4. Every 4-6 hours:  If on nasal continuous positive airway pressure, respiratory therapy assess nares and determine need for appliance change or resting period. Outcome: Progressing   No new skin issues at this time. Problem: Pain  Goal: Verbalizes/displays adequate comfort level or baseline comfort level  Outcome: Progressing   Denies any chest pain, declines the schedules nitro, as a result.

## 2023-01-07 NOTE — PLAN OF CARE
Problem: Pain  Goal: Verbalizes/displays adequate comfort level or baseline comfort level  1/6/2023 2132 by Jaqueline Ferguson RN  Outcome: Progressing  Note: Pt currently displays and verbalizes an adequate comfort level. Pt encouraged and educated to monitor and assess pain. Pt encouraged to call out for assistance with pain. Non-pharm measures implemented.      Problem: Discharge Planning  Goal: Discharge to home or other facility with appropriate resources  Outcome: Progressing  Discharge to home or other facility with appropriate resources:   Identify barriers to discharge with patient and caregiver   Arrange for needed discharge resources and transportation as appropriate   Identify discharge learning needs (meds, wound care, etc)   Arrange for interpreters to assist at discharge as needed   Refer to discharge planning if patient needs post-hospital services based on physician order or complex needs related to functional status, cognitive ability or social support system

## 2023-01-07 NOTE — DISCHARGE INSTRUCTIONS
Please follow up with your PCP in 5-7 days. Also, please start keeping a log of your chest pain and facial numbness symptoms. In the log, write down the time and the activity you were doing when the symptoms come on along with the time and activity you were doing when the symptoms go away. Present to your primary care doctor to further work up the causes of your symptoms.

## 2023-01-07 NOTE — PROGRESS NOTES
Cardiology - PROGRESS NOTE    Admit Date: 1/5/2023     Chief Complaint: CP     Interval History:   Patient seen and examined and notes reviewed. Patient is being followed for CP. Patient had presented with complaints of chest discomfort. He describes it as a chest pressure that occurs along the lateral ribs on his left side. The sensation would normally start in his back and then wrap forward. Is a dull ache that appears to worsen with sitting. His symptoms are relieved by either standing or lying down. They last for about 10 to 20 minutes in length and he does not take any medications to relieve his symptoms. He also states the same time he has a pain in the left side of his neck to the bottom of his head that causes a headache and dizziness that occurs at the same time as his chest discomfort. He was feeling some twitching in his left lower lip when he was having his left-sided head and neck pain. Did have a calcium score in June 2022 that was 0. He had a stress test in 2021 that showed normal myocardial perfusion. He had a stress test that was within normal limits. His echo showed a normal EF and no wall motion abnormality. Is not having any chest discomfort this morning.     In: 790 [P.O.:780; I.V.:10]  Out: -    Wt Readings from Last 2 Encounters:   01/05/23 (!) 300 lb 7 oz (136.3 kg)   12/29/22 298 lb 14.4 oz (135.6 kg)       Data:   Scheduled Meds:   Scheduled Meds:   enoxaparin  30 mg SubCUTAneous BID    amLODIPine  5 mg Oral Daily    atorvastatin  80 mg Oral Daily    nitroglycerin  1 inch Topical 4 times per day    sodium chloride flush  5-40 mL IntraVENous 2 times per day    pantoprazole  40 mg IntraVENous Daily     Continuous Infusions:   sodium chloride       PRN Meds:.sodium chloride flush, sodium chloride, polyethylene glycol, acetaminophen **OR** acetaminophen, ondansetron  Continuous Infusions:   sodium chloride         Intake/Output Summary (Last 24 hours) at 1/7/2023 1022  Last data filed at 1/7/2023 0000  Gross per 24 hour   Intake 780 ml   Output --   Net 780 ml       CBC:   Lab Results   Component Value Date/Time    WBC 8.2 01/07/2023 06:53 AM    HGB 14.0 01/07/2023 06:53 AM     01/07/2023 06:53 AM     BMP:  Lab Results   Component Value Date/Time     01/07/2023 06:53 AM    K 5.1 01/07/2023 06:53 AM     01/07/2023 06:53 AM    CO2 19 01/07/2023 06:53 AM    BUN 15 01/07/2023 06:53 AM    CREATININE 0.9 01/07/2023 06:53 AM    GLUCOSE 131 01/07/2023 06:53 AM     INR: No results found for: INR     CARDIAC LABS  ENZYMES:  Recent Labs     01/05/23  1445   TROPONINI <0.01     FASTING LIPID PANEL:  Lab Results   Component Value Date/Time    HDL 65 01/06/2023 05:51 AM    LDLCALC 73 01/06/2023 05:51 AM    TRIG 56 01/06/2023 05:51 AM     LIVER PROFILE:  Lab Results   Component Value Date/Time    AST 17 01/05/2023 02:45 PM    ALT 18 01/05/2023 02:45 PM       -----------------------------------------------------------------  Telemetry: Personally reviewed NSR    Objective:   Vitals: /82   Pulse 74   Temp 98 °F (36.7 °C) (Oral)   Resp 16   Ht 5' 9\" (1.753 m)   Wt (!) 300 lb 7 oz (136.3 kg)   SpO2 95%   BMI 44.37 kg/m²   General appearance: alert, appears stated age and cooperative, No acute distress   Eyes: Conjunctiva and pupils normal and reactive  Skin: Skin color, texture, turgor normal. No rashes or ecchymosis.   Neck: no JVD, supple, symmetrical, trachea midline   Lungs: , no accessory muscle use, no respiratory distress  Heart: RRR  Abdomen: soft, non-tender; bowel sounds normal  Extremities: No edema, DP +  Psychiatric: normal insight and affect    Patient Active Problem List:     Atypical chest pain     TIA (transient ischemic attack)        Assessment & Plan:      Atypical CP  HTN     49 y/o man with a h/o HTN, HLD, DM, who p/w CP, lightheadedness/dizziness, L facial numbness and numbness/weakness of LUE, CT head showed no acute process, CTA head/neck showed normal carotids, CT calcium score of ) (6/2022), MPI (3/2021) WNL, echo showed an EF 55-60%, top < 0.01.    CP  - No s/s  - Ca+ score of 0 (6/2022)  - MPI (3/2021) WNL  - MPI (1/6/2023) WNL  - Echo - EF 55-60%, no WMA  - Trop <0.01  - Reviewed labs  - Keep K+ > 4.0 and Mg > 2.0      HTN  - BP controlled  - On amlodipine 5 mg QD    Mauricio Bowen [address]      I spent a total of 35 minutes in care of the patient and greater than 50% of the time was spent counseling with Pennelope Hatchet and coordinating care regarding their diagnosis, treatments and plan of care.

## 2023-01-08 LAB
EKG ATRIAL RATE: 92 BPM
EKG DIAGNOSIS: NORMAL
EKG P AXIS: 45 DEGREES
EKG P-R INTERVAL: 132 MS
EKG Q-T INTERVAL: 362 MS
EKG QRS DURATION: 70 MS
EKG QTC CALCULATION (BAZETT): 447 MS
EKG R AXIS: 12 DEGREES
EKG T AXIS: 63 DEGREES
EKG VENTRICULAR RATE: 92 BPM

## 2023-01-08 PROCEDURE — 93010 ELECTROCARDIOGRAM REPORT: CPT | Performed by: INTERNAL MEDICINE

## 2023-01-08 NOTE — DISCHARGE SUMMARY
INTERNAL MEDICINE DEPARTMENT AT [address] [place] [address]  DISCHARGE SUMMARY    Patient ID: Yaakov Hazle                                             Discharge Date: 1/8/2023   Patient's PCP: Umm Brooke DO, DO                                          Discharge Physician: Williams Mahajan MD MD  Admit Date: 1/5/2023   Admitting Physician: Nedra Aquino MD    PROBLEMS DURING HOSPITALIZATION:  Present on Admission:   Atypical chest pain   TIA (transient ischemic attack)      DISCHARGE DIAGNOSES:  Atypical Chest Pain with neurological features    HPI: Patient initially presented to ED complaining of an episode of acute chest pain associated with dizziness and numbness/weakness of left upper extremity. He said the pain started at the left side of his mid back and migrated over to his left chest. He started to panic and developed  left upper extremity numbness and weakness. These episodes have been going on for 6 months. By the time he was seen in the ED, his neurological symptoms and chest pain had abated. CT and CTA of head were performed and showed no signs of ischemia or hemorrhage. Neurology saw him the nextmorning and noted no neurological deficits. They signed off on him after one visit. Patient was also referred to cardiology. An EKG showed normal sinus rhythm with no ischemic changes. A stress test was then performed, showing normal isotope uptake at rest and stress. Thus, no evidence of myocardial ischemia. Echo was also performed showing an EF of 55-60% with normal diastolic function and no regional wall motion abnormalities noted. The patient was released after cardiology signed off on the patient. He is to follow up with his PCP and keep a log of his symptoms.     The following issues were addressed during hospitalization:    1) Atypical Chest pain with neurological features: Patient had a CT/CTA to evaluate for any signs of ischemia or hemorrhage that could account for feelings of dizziness, numbness and weakness. Findings were negative. Cardiac workup done to look for cardiac etiology of neurological symptoms and chest pain, but none found. Physical Exam  Cardiovascular:      Rate and Rhythm: Normal rate and regular rhythm. Pulses: Normal pulses. Heart sounds: Normal heart sounds. Pulmonary:      Effort: Pulmonary effort is normal.      Breath sounds: Normal breath sounds. Abdominal:      General: Abdomen is flat. Palpations: Abdomen is soft. Neurological:      General: No focal deficit present. Mental Status: He is alert and oriented to person, place, and time.         Consults: cardiology; neurology  Significant Diagnostic Studies:  CT/CTA of head; Echocardiogram; Cardiac Stress Test  Treatments: IV hydration  Disposition: home  Discharged Condition: Stable  Follow Up: Primary Care Physician in one week    DISCHARGE MEDICATION:       Medication List        CONTINUE taking these medications      amLODIPine 5 MG tablet  Commonly known as: NORVASC     atorvastatin 80 MG tablet  Commonly known as: LIPITOR     FeroSul 325 (65 Fe) MG tablet  Generic drug: ferrous sulfate     Jardiance 10 MG tablet  Generic drug: empagliflozin     lidocaine 5 %  Commonly known as: LIDODERM  Place 1 patch onto the skin daily for 10 days 12 hours on, 12 hours off.     lisinopril 20 MG tablet  Commonly known as: PRINIVIL;ZESTRIL     metFORMIN 500 MG tablet  Commonly known as: GLUCOPHAGE     methocarbamol 750 MG tablet  Commonly known as: Robaxin-750  Take 1 tablet by mouth 3 times daily as needed (muscle spasms)     omeprazole 40 MG delayed release capsule  Commonly known as: PRILOSEC            STOP taking these medications      ibuprofen 800 MG tablet  Commonly known as: IBU               Where to Get Your Medications        These medications were sent to [facility] [address] [place] [address] Street, [address] [facility] [phone]  [address] [place] [address], [address] [place] [id number]      Phone: [phone]   methocarbamol 750 MG tablet          Activity: activity as tolerated  Diet: regular diet  Wound Care: none needed    Time Spent on discharge is more than 30 minutes    Signed:  Arun Hsu MD,  PGY-1  1/8/2023

## 2023-01-08 NOTE — PROGRESS NOTES
Discharge order received. Patient informed of discharge order. Discharge instructions reviewed with patient and spouse. Copy of discharge instructions given to patient. Patient verbalized understanding, denies needs or questions at this time. IV and telemetry removed. All patient belongings packed and sent with patient upon discharge. Patient left ambulatory to private vehicle for transportation to private residence.

## 2025-07-14 ENCOUNTER — HOSPITAL ENCOUNTER (EMERGENCY)
Age: 55
Discharge: HOME | End: 2025-07-14
Attending: EMERGENCY MEDICINE
Payer: COMMERCIAL

## 2025-07-14 VITALS
SYSTOLIC BLOOD PRESSURE: 136 MMHG | HEART RATE: 79 BPM | HEIGHT: 69 IN | BODY MASS INDEX: 44.6 KG/M2 | DIASTOLIC BLOOD PRESSURE: 57 MMHG | TEMPERATURE: 98.8 F | WEIGHT: 301.15 LBS | OXYGEN SATURATION: 98 % | RESPIRATION RATE: 18 BRPM

## 2025-07-14 DIAGNOSIS — T78.3XXA ANGIOEDEMA, INITIAL ENCOUNTER: Primary | ICD-10-CM

## 2025-07-14 PROCEDURE — 96374 THER/PROPH/DIAG INJ IV PUSH: CPT

## 2025-07-14 PROCEDURE — 99284 EMERGENCY DEPT VISIT MOD MDM: CPT

## 2025-07-14 PROCEDURE — 96375 TX/PRO/DX INJ NEW DRUG ADDON: CPT

## 2025-07-14 PROCEDURE — 6360000002 HC RX W HCPCS: Performed by: PHYSICIAN ASSISTANT

## 2025-07-14 PROCEDURE — 2500000003 HC RX 250 WO HCPCS: Performed by: PHYSICIAN ASSISTANT

## 2025-07-14 RX ORDER — DIPHENHYDRAMINE HYDROCHLORIDE 50 MG/ML
50 INJECTION, SOLUTION INTRAMUSCULAR; INTRAVENOUS ONCE
Status: COMPLETED | OUTPATIENT
Start: 2025-07-14 | End: 2025-07-14

## 2025-07-14 RX ORDER — METHYLPREDNISOLONE SODIUM SUCCINATE 125 MG/2ML
INJECTION INTRAMUSCULAR; INTRAVENOUS
Status: DISCONTINUED
Start: 2025-07-14 | End: 2025-07-15 | Stop reason: HOSPADM

## 2025-07-14 RX ADMIN — DIPHENHYDRAMINE HYDROCHLORIDE 50 MG: 50 INJECTION INTRAMUSCULAR; INTRAVENOUS at 19:05

## 2025-07-14 RX ADMIN — FAMOTIDINE 20 MG: 10 INJECTION, SOLUTION INTRAVENOUS at 19:07

## 2025-07-14 ASSESSMENT — PAIN - FUNCTIONAL ASSESSMENT
PAIN_FUNCTIONAL_ASSESSMENT: NONE - DENIES PAIN

## 2025-07-14 NOTE — ED PROVIDER NOTES
[facility] EMERGENCY DEPARTMENT  EMERGENCY DEPARTMENT ENCOUNTER        Pt Name: Ross Downey  MRN: [id number]  Birthdate 1970  Date of evaluation: 7/14/2025  Provider: Vanesa Andre PA-C  PCP: Los Martin DO  Note Started: 7:01 PM EDT 7/14/25       I have seen and evaluated this patient with my supervising physician Dr. Sanchez      CHIEF COMPLAINT       Chief Complaint   Patient presents with    Angioedema       HISTORY OF PRESENT ILLNESS: 1 or more Elements     History From: see above  Limitations to history : None    Ross Downey is a 55 y.o. male who presents to the emergency department by private vehicle complaining of lip swelling. Patient states his lower lip began swelling about 2 hours prior to arrival. Patient denies having symptoms like this previously. Patient denies any tongue swelling, throat swelling/tightness, chest pain, sob, N/V. Patient has been on lisinpril for about 1 year without issues. Denies any new medications, dietary changes, lotions/detergents/creams. No other complaints at this time.     Nursing Notes were all reviewed and agreed with or any disagreements were addressed in the HPI.    REVIEW OF SYSTEMS :      Review of Systems    Positives and Pertinent negatives as per HPI.     SURGICAL HISTORY   History reviewed. No pertinent surgical history.    CURRENTMEDICATIONS       Current Discharge Medication List        CONTINUE these medications which have NOT CHANGED    Details   amLODIPine (NORVASC) 5 MG tablet TAKE 1 TABLET BY MOUTH DAILY      atorvastatin (LIPITOR) 80 MG tablet TAKE 1 TABLET BY MOUTH DAILY      JARDIANCE 10 MG tablet TAKE 1 TABLET BY MOUTH EVERY MORNING      FEROSUL 325 (65 Fe) MG tablet TAKE 1 TABLET BY MOUTH DAILY      metFORMIN (GLUCOPHAGE) 500 MG tablet Take 500 mg by mouth 2 times daily (with meals)      omeprazole (PRILOSEC) 40 MG delayed release capsule Take 40 mg by mouth daily             ALLERGIES     Lisinopril    FAMILYHISTORY     History

## 2025-07-14 NOTE — ED NOTES
Patient requesting something to drink at this time. Provider made aware. Provider is ok with oral swabs at this time to moisten their mouth.

## 2025-07-15 NOTE — ED PROVIDER NOTES
[facility] EMERGENCY DEPARTMENT     EMERGENCY DEPARTMENT ENCOUNTER     Location: [facility] EMERGENCY DEPARTMENT  7/14/2025  Note Started: 12:08 AM EDT 7/15/25      Patient Identification  Ross Downey is a 55 y.o. male      HPI:Ross Downey was evaluated in the Emergency Department for swelling of his lower lips that started about 2 hours ago.  Patient is on lisinopril.  No known exposures to any allergens and he has no other symptoms concerning for allergic reaction. Although initial history and physical exam information was obtained by IRIS/NPP/MD/DO (who also dictated a record of this visit), I personally saw the patient and performed and made/approved the management plan and take responsibility for the patient management.      PHYSICAL EXAM:  Nontoxic-appearing adult male in no acute distress he has diffuse swelling of the lower lip.  There is no swelling of the tongue or posterior pharynx.  He has no stridor.  Breathing comfortably    EKG Interpretation      Patient seen and evaluated.  Relevant records reviewed.  MDM: Adult male who comes in with what appears to be angioedema of the lower lip.  Given the localized nature of this with no other symptoms concerning for allergic reaction or anaphylaxis I believe that this is angioedema due to lisinopril.  While, medications for allergic reactions were given we avoided prednisone because the patient's diabetes is uncontrolled and I really do not believe that this is a histamine related reaction but rather bradykinin with just prednisone would not help.  Patient is kept in the emergency room for several hours to ensure no acute worsening.  He has had no changed.  His tongue and pharynx remain clear.  He remains asymptomatic with regards to his airway he has no stridor.  He will be discharged home.  Lisinopril was added to his allergy list.  It is discontinued and his chart and he is instructed to contact his primary care provider for a new

## 2025-07-15 NOTE — DISCHARGE INSTRUCTIONS
Lisinopril has been added to your allergy list.  You should not take this medication.  You should discard the ones that you have at home.  It is very important that you follow-up with your primary care provider promptly to be started on a new medication for your high blood pressure.

## 2025-08-26 ENCOUNTER — HOSPITAL ENCOUNTER (EMERGENCY)
Age: 55
Discharge: HOME | End: 2025-08-26
Attending: EMERGENCY MEDICINE
Payer: COMMERCIAL

## 2025-08-26 ENCOUNTER — APPOINTMENT (OUTPATIENT)
Dept: CT IMAGING | Age: 55
End: 2025-08-26
Payer: COMMERCIAL

## 2025-08-26 VITALS
OXYGEN SATURATION: 97 % | HEART RATE: 75 BPM | RESPIRATION RATE: 23 BRPM | SYSTOLIC BLOOD PRESSURE: 156 MMHG | BODY MASS INDEX: 43.87 KG/M2 | HEIGHT: 69 IN | TEMPERATURE: 98.7 F | DIASTOLIC BLOOD PRESSURE: 89 MMHG | WEIGHT: 296.2 LBS

## 2025-08-26 DIAGNOSIS — G44.319 ACUTE POST-TRAUMATIC HEADACHE, NOT INTRACTABLE: ICD-10-CM

## 2025-08-26 DIAGNOSIS — R03.0 ELEVATED BLOOD PRESSURE READING: ICD-10-CM

## 2025-08-26 DIAGNOSIS — R07.9 CHEST PAIN, UNSPECIFIED TYPE: Primary | ICD-10-CM

## 2025-08-26 LAB
ALBUMIN SERPL-MCNC: 4.5 G/DL (ref 3.4–5)
ALBUMIN/GLOB SERPL: 1.5 {RATIO} (ref 1.1–2.2)
ALP SERPL-CCNC: 73 U/L (ref 40–129)
ALT SERPL-CCNC: 19 U/L (ref 10–40)
ANION GAP SERPL CALCULATED.3IONS-SCNC: 14 MMOL/L (ref 3–16)
AST SERPL-CCNC: 24 U/L (ref 15–37)
BASOPHILS # BLD: 0.1 K/UL (ref 0–0.2)
BASOPHILS NFR BLD: 0.9 %
BILIRUB SERPL-MCNC: 0.5 MG/DL (ref 0–1)
BUN SERPL-MCNC: 14 MG/DL (ref 7–20)
CALCIUM SERPL-MCNC: 9.7 MG/DL (ref 8.3–10.6)
CHLORIDE SERPL-SCNC: 101 MMOL/L (ref 99–110)
CO2 SERPL-SCNC: 25 MMOL/L (ref 21–32)
CREAT SERPL-MCNC: 1.1 MG/DL (ref 0.9–1.3)
DEPRECATED RDW RBC AUTO: 15.6 % (ref 12.4–15.4)
EOSINOPHIL # BLD: 0.2 K/UL (ref 0–0.6)
EOSINOPHIL NFR BLD: 3.1 %
GFR SERPLBLD CREATININE-BSD FMLA CKD-EPI: 80 ML/MIN/{1.73_M2}
GLUCOSE SERPL-MCNC: 113 MG/DL (ref 70–99)
HCT VFR BLD AUTO: 43.5 % (ref 40.5–52.5)
HGB BLD-MCNC: 13.9 G/DL (ref 13.5–17.5)
LYMPHOCYTES # BLD: 2.4 K/UL (ref 1–5.1)
LYMPHOCYTES NFR BLD: 35.5 %
MCH RBC QN AUTO: 23.3 PG (ref 26–34)
MCHC RBC AUTO-ENTMCNC: 32 G/DL (ref 31–36)
MCV RBC AUTO: 72.8 FL (ref 80–100)
MONOCYTES # BLD: 0.8 K/UL (ref 0–1.3)
MONOCYTES NFR BLD: 11.8 %
NEUTROPHILS # BLD: 3.4 K/UL (ref 1.7–7.7)
NEUTROPHILS NFR BLD: 48.7 %
PLATELET # BLD AUTO: 219 K/UL (ref 135–450)
PMV BLD AUTO: 7.8 FL (ref 5–10.5)
POTASSIUM SERPL-SCNC: 4 MMOL/L (ref 3.5–5.1)
PROT SERPL-MCNC: 7.5 G/DL (ref 6.4–8.2)
RBC # BLD AUTO: 5.97 M/UL (ref 4.2–5.9)
SODIUM SERPL-SCNC: 140 MMOL/L (ref 136–145)
TROPONIN, HIGH SENSITIVITY: 8 NG/L (ref 0–22)
TROPONIN, HIGH SENSITIVITY: 9 NG/L (ref 0–22)
WBC # BLD AUTO: 6.9 K/UL (ref 4–11)

## 2025-08-26 PROCEDURE — 6360000004 HC RX CONTRAST MEDICATION: Performed by: EMERGENCY MEDICINE

## 2025-08-26 PROCEDURE — 93005 ELECTROCARDIOGRAM TRACING: CPT | Performed by: EMERGENCY MEDICINE

## 2025-08-26 PROCEDURE — 99285 EMERGENCY DEPT VISIT HI MDM: CPT

## 2025-08-26 PROCEDURE — 70450 CT HEAD/BRAIN W/O DYE: CPT

## 2025-08-26 PROCEDURE — 84484 ASSAY OF TROPONIN QUANT: CPT

## 2025-08-26 PROCEDURE — 85025 COMPLETE CBC W/AUTO DIFF WBC: CPT

## 2025-08-26 PROCEDURE — 70498 CT ANGIOGRAPHY NECK: CPT

## 2025-08-26 PROCEDURE — 80053 COMPREHEN METABOLIC PANEL: CPT

## 2025-08-26 RX ORDER — DICLOFENAC SODIUM 75 MG/1
75 TABLET, DELAYED RELEASE ORAL 2 TIMES DAILY
Qty: 20 TABLET | Refills: 0 | Status: SHIPPED | OUTPATIENT
Start: 2025-08-26

## 2025-08-26 RX ORDER — IOPAMIDOL 755 MG/ML
75 INJECTION, SOLUTION INTRAVASCULAR
Status: COMPLETED | OUTPATIENT
Start: 2025-08-26 | End: 2025-08-26

## 2025-08-26 RX ADMIN — IOPAMIDOL 75 ML: 755 INJECTION, SOLUTION INTRAVENOUS at 16:30

## 2025-08-27 LAB
EKG ATRIAL RATE: 64 BPM
EKG DIAGNOSIS: NORMAL
EKG P AXIS: 40 DEGREES
EKG P-R INTERVAL: 134 MS
EKG Q-T INTERVAL: 394 MS
EKG QRS DURATION: 84 MS
EKG QTC CALCULATION (BAZETT): 406 MS
EKG R AXIS: 16 DEGREES
EKG T AXIS: 94 DEGREES
EKG VENTRICULAR RATE: 64 BPM

## 2025-08-27 PROCEDURE — 93010 ELECTROCARDIOGRAM REPORT: CPT | Performed by: INTERNAL MEDICINE

## 2025-08-29 ASSESSMENT — HEART SCORE: ECG: NON-SPECIFC REPOLARIZATION DISTURBANCE/LBTB/PM
